# Patient Record
Sex: FEMALE | Race: WHITE | NOT HISPANIC OR LATINO | Employment: UNEMPLOYED | URBAN - METROPOLITAN AREA
[De-identification: names, ages, dates, MRNs, and addresses within clinical notes are randomized per-mention and may not be internally consistent; named-entity substitution may affect disease eponyms.]

---

## 2017-01-23 ENCOUNTER — TELEPHONE (OUTPATIENT)
Dept: NEUROLOGY | Facility: MEDICAL CENTER | Age: 42
End: 2017-01-23

## 2017-01-23 NOTE — TELEPHONE ENCOUNTER
Angelika's  called in and spoke with Dr Soto about his wife stating she had a SZ because she had vomited her Keppra. He states she has a Gi problem so Dr Soto told him to keep giving her Zofran to that she can hold down her pills and stop having SZ. No seizures when taking Zofran. I called pt to see how she was today. She states she is doing well and no more Seizures since taking the Zofran. She says she has plenty of refills from her Gi doctor. Pt will call us if she has any more problems.

## 2017-02-03 NOTE — TELEPHONE ENCOUNTER
John Strong M.D.  Kiera Paula, Med Ass't       Caller: Unspecified (1 week ago)                     She should not drive for now.   Please make sure you document that you told pt.   Thanks,   RA       I called pt and told her she should not drive. She states she has not had any more sz since this but she understood and said she would not drive until she sees Dr Strong. She has EEG scheduled in march as well as F/V

## 2017-02-06 ENCOUNTER — TELEPHONE (OUTPATIENT)
Dept: NEUROLOGY | Facility: MEDICAL CENTER | Age: 42
End: 2017-02-06

## 2017-02-06 NOTE — TELEPHONE ENCOUNTER
Angelika decided she would like to cancel the 24 hour amb EEG and just come for the EMU admission. She does have a follow up on 3/30/17 should she keep that appointment or wait until after the EMU? She said she has been having small seizures so she wants to know what you think.

## 2017-03-08 ENCOUNTER — TELEPHONE (OUTPATIENT)
Dept: NEUROLOGY | Facility: MEDICAL CENTER | Age: 42
End: 2017-03-08

## 2017-03-08 NOTE — TELEPHONE ENCOUNTER
----- Message from John Strong M.D. sent at 3/8/2017  7:18 AM PST -----  Regarding: FW: Non-Urgent Medical Question  Contact: [phone]  I can only provide a generic letter stating her condition. I cannot attest that the incident at [facility] was a seizure or related to a seizure.   RA      ----- Message -----     From: Kiera Paula, Med Ass't     Sent: 2/28/2017   3:50 PM       To: John Strong M.D.  Subject: FW: Non-Urgent Medical Question                  Called pt and she states she needs a letter for court. She states she was at [facility] and was overwhelmed because he son was running around. She started becoming really confused. She states she forgot to scan her dog food at the self check out and they charged her with petty gonzalez. She would like a letter stating she has seizures and it is normal for her to become confused    Please Advise    Thanks Kiera SHAFFER   ----- Message -----     From: Imani Wang, Med Ass't     Sent: 2/28/2017   3:34 PM       To: Kiera Paula Med Ass't  Subject: FW: Non-Urgent Medical Question                      ----- Message -----     From: Angelika Hamilton     Sent: 2/28/2017   3:02 PM       To: Neurology [facility]  Subject: Non-Urgent Medical Question                      I need to please speak with either Dr. Strong or his medical assistant concerning the episodes of becoming easily confused and overwheled that I been having since my seizures in 2016.  I have an appointment with Dr. Strong on March 30th but I am in need of some documentation for court on March 7th of my seizures and ongoing treatment. If you could please call me at [phone] so I can explain in further detail and to see if the Dr can provide a document or letter to the court.    Thank you  Angelika

## 2017-03-09 NOTE — TELEPHONE ENCOUNTER
Message  Received: Yesterday       John Strong M.D.  Kiera Paula, Med Ass't       Caller: Unspecified (Yesterday, 8:01 AM)                     Did u speak with her? I can only offer a generic letter that I have seen her once for epilepsy.   RA            Previous Messages          Pt advised and mailed to her as she wanted.

## 2017-03-10 NOTE — TELEPHONE ENCOUNTER
Yes I spoke with pt. She states she has f/v with you before her court date and would like to discuss further a follow up. I mailed her the letter you wrote per her request.

## 2017-03-30 ENCOUNTER — APPOINTMENT (OUTPATIENT)
Dept: NEUROLOGY | Facility: MEDICAL CENTER | Age: 42
End: 2017-03-30
Payer: COMMERCIAL

## 2017-03-30 ENCOUNTER — TELEPHONE (OUTPATIENT)
Dept: NEUROLOGY | Facility: MEDICAL CENTER | Age: 42
End: 2017-03-30

## 2017-03-30 NOTE — TELEPHONE ENCOUNTER
"Angelika called stating she has been throwing up all morning and that she possibly had a seizure last night. She was \"out of it\" and \"speaking nonsense\" to her . I let her know we still have her scheduled for EMU Monday 4/3/17 but that the insurance is taking longer than expected to approve the visit. I will call today and request an urgent auth.  "

## 2017-03-30 NOTE — PROGRESS NOTES
Future direct admitt for 4/3/17 at 6a for epilepsy monitor x5days.  Dr Strong accepting, ADT signed and held on 3/30/17 @ 1241p and will need to be released upon pt arrival. ADT scanned into epic.  Pt's arriving via POV

## 2017-04-03 ENCOUNTER — HOSPITAL ENCOUNTER (INPATIENT)
Facility: MEDICAL CENTER | Age: 42
LOS: 4 days | DRG: 101 | End: 2017-04-07
Attending: PSYCHIATRY & NEUROLOGY | Admitting: PSYCHIATRY & NEUROLOGY
Payer: COMMERCIAL

## 2017-04-03 DIAGNOSIS — G40.109 LOCALIZATION-RELATED EPILEPSY (HCC): ICD-10-CM

## 2017-04-03 DIAGNOSIS — R41.3 MEMORY LOSS: Chronic | ICD-10-CM

## 2017-04-03 PROCEDURE — A9270 NON-COVERED ITEM OR SERVICE: HCPCS | Performed by: PSYCHIATRY & NEUROLOGY

## 2017-04-03 PROCEDURE — 700102 HCHG RX REV CODE 250 W/ 637 OVERRIDE(OP): Performed by: PSYCHIATRY & NEUROLOGY

## 2017-04-03 PROCEDURE — 700111 HCHG RX REV CODE 636 W/ 250 OVERRIDE (IP): Performed by: PSYCHIATRY & NEUROLOGY

## 2017-04-03 PROCEDURE — 95951 EEG: CPT

## 2017-04-03 PROCEDURE — 770020 HCHG ROOM/CARE - TELE (206)

## 2017-04-03 RX ORDER — LORAZEPAM 2 MG/ML
1 INJECTION INTRAMUSCULAR
Status: DISCONTINUED | OUTPATIENT
Start: 2017-04-03 | End: 2017-04-07 | Stop reason: HOSPADM

## 2017-04-03 RX ORDER — CYCLOBENZAPRINE HCL 10 MG
10 TABLET ORAL 3 TIMES DAILY PRN
Status: DISCONTINUED | OUTPATIENT
Start: 2017-04-03 | End: 2017-04-07 | Stop reason: HOSPADM

## 2017-04-03 RX ORDER — OXYCODONE AND ACETAMINOPHEN 7.5; 325 MG/1; MG/1
1 TABLET ORAL EVERY 8 HOURS PRN
Status: DISCONTINUED | OUTPATIENT
Start: 2017-04-03 | End: 2017-04-07 | Stop reason: HOSPADM

## 2017-04-03 RX ORDER — GABAPENTIN 300 MG/1
600 CAPSULE ORAL 3 TIMES DAILY
Status: DISCONTINUED | OUTPATIENT
Start: 2017-04-03 | End: 2017-04-07 | Stop reason: HOSPADM

## 2017-04-03 RX ORDER — POTASSIUM CHLORIDE 750 MG/1
10 TABLET, FILM COATED, EXTENDED RELEASE ORAL 2 TIMES DAILY
COMMUNITY
End: 2017-08-08

## 2017-04-03 RX ORDER — POTASSIUM CHLORIDE 1.5 G/1.58G
10 POWDER, FOR SOLUTION ORAL 2 TIMES DAILY
Status: ON HOLD | COMMUNITY
End: 2017-04-03

## 2017-04-03 RX ORDER — ESTRADIOL 1 MG/1
1.5 TABLET ORAL DAILY
Status: DISCONTINUED | OUTPATIENT
Start: 2017-04-03 | End: 2017-04-07 | Stop reason: HOSPADM

## 2017-04-03 RX ORDER — ALBUTEROL SULFATE 90 UG/1
2 AEROSOL, METERED RESPIRATORY (INHALATION) EVERY 6 HOURS PRN
Status: DISCONTINUED | OUTPATIENT
Start: 2017-04-03 | End: 2017-04-07 | Stop reason: HOSPADM

## 2017-04-03 RX ORDER — CYCLOBENZAPRINE HCL 10 MG
10 TABLET ORAL 3 TIMES DAILY PRN
COMMUNITY
End: 2023-01-18

## 2017-04-03 RX ORDER — LISINOPRIL 10 MG/1
10 TABLET ORAL DAILY
Status: DISCONTINUED | OUTPATIENT
Start: 2017-04-03 | End: 2017-04-07 | Stop reason: HOSPADM

## 2017-04-03 RX ORDER — POTASSIUM CHLORIDE 1.5 G/1.58G
10 POWDER, FOR SOLUTION ORAL 2 TIMES DAILY
Status: DISCONTINUED | OUTPATIENT
Start: 2017-04-03 | End: 2017-04-04

## 2017-04-03 RX ORDER — PAROXETINE HYDROCHLORIDE 20 MG/1
20 TABLET, FILM COATED ORAL NIGHTLY
Status: DISCONTINUED | OUTPATIENT
Start: 2017-04-03 | End: 2017-04-07 | Stop reason: HOSPADM

## 2017-04-03 RX ORDER — ALPRAZOLAM 1 MG/1
1 TABLET ORAL
Status: DISCONTINUED | OUTPATIENT
Start: 2017-04-03 | End: 2017-04-07 | Stop reason: HOSPADM

## 2017-04-03 RX ORDER — ALBUTEROL SULFATE 2.5 MG/3ML
2.5 SOLUTION RESPIRATORY (INHALATION) EVERY 4 HOURS PRN
Status: DISCONTINUED | OUTPATIENT
Start: 2017-04-03 | End: 2017-04-07 | Stop reason: HOSPADM

## 2017-04-03 RX ADMIN — OXYCODONE AND ACETAMINOPHEN 1 TABLET: 7.5; 325 TABLET ORAL at 09:34

## 2017-04-03 RX ADMIN — ALPRAZOLAM 1 MG: 1 TABLET ORAL at 22:11

## 2017-04-03 RX ADMIN — POTASSIUM CHLORIDE 10 MEQ: 1.5 POWDER, FOR SOLUTION ORAL at 09:35

## 2017-04-03 RX ADMIN — POTASSIUM CHLORIDE 10 MEQ: 1.5 POWDER, FOR SOLUTION ORAL at 22:12

## 2017-04-03 RX ADMIN — OXYCODONE AND ACETAMINOPHEN 1 TABLET: 7.5; 325 TABLET ORAL at 22:11

## 2017-04-03 RX ADMIN — CYCLOBENZAPRINE HYDROCHLORIDE 10 MG: 10 TABLET, FILM COATED ORAL at 22:11

## 2017-04-03 RX ADMIN — ENOXAPARIN SODIUM 40 MG: 100 INJECTION SUBCUTANEOUS at 09:35

## 2017-04-03 RX ADMIN — LISINOPRIL 10 MG: 10 TABLET ORAL at 09:34

## 2017-04-03 RX ADMIN — PAROXETINE HYDROCHLORIDE 20 MG: 20 TABLET, FILM COATED ORAL at 22:11

## 2017-04-03 RX ADMIN — GABAPENTIN 600 MG: 300 CAPSULE ORAL at 15:04

## 2017-04-03 RX ADMIN — GABAPENTIN 600 MG: 300 CAPSULE ORAL at 22:11

## 2017-04-03 RX ADMIN — GABAPENTIN 600 MG: 300 CAPSULE ORAL at 09:34

## 2017-04-03 RX ADMIN — CYCLOBENZAPRINE HYDROCHLORIDE 10 MG: 10 TABLET, FILM COATED ORAL at 16:14

## 2017-04-03 ASSESSMENT — LIFESTYLE VARIABLES
ALCOHOL_USE: YES
AVERAGE NUMBER OF DAYS PER WEEK YOU HAVE A DRINK CONTAINING ALCOHOL: 1
TOTAL SCORE: 4
EVER_SMOKED: YES
TOTAL SCORE: 4
ON A TYPICAL DAY WHEN YOU DRINK ALCOHOL HOW MANY DRINKS DO YOU HAVE: 1
HAVE YOU EVER FELT YOU SHOULD CUT DOWN ON YOUR DRINKING: YES
EVER FELT BAD OR GUILTY ABOUT YOUR DRINKING: YES
TOTAL SCORE: 4
EVER HAD A DRINK FIRST THING IN THE MORNING TO STEADY YOUR NERVES TO GET RID OF A HANGOVER: YES
DOES PATIENT WANT TO STOP DRINKING: NO
HOW MANY TIMES IN THE PAST YEAR HAVE YOU HAD 5 OR MORE DRINKS IN A DAY: 0
HAVE PEOPLE ANNOYED YOU BY CRITICIZING YOUR DRINKING: YES
CONSUMPTION TOTAL: POSITIVE

## 2017-04-03 ASSESSMENT — PAIN SCALES - GENERAL
PAINLEVEL_OUTOF10: 4
PAINLEVEL_OUTOF10: 4
PAINLEVEL_OUTOF10: 3
PAINLEVEL_OUTOF10: 6
PAINLEVEL_OUTOF10: 6

## 2017-04-03 NOTE — PROCEDURES
VIDEO ELECTROENCEPHALOGRAM / EPILEPSY MONITORING UNIT REPORT          DOS:   4/3/2017 - 4/7/2017      INDICATION:  Angelika Hamilton 41 y.o. female presenting with recurrent seizures.     CURRENT ANTIEPILEPTIC REGIMEN: Levetiracetam 750 mg po bid, which was held during the admission.       TECHNIQUE: A 30-channel, 5 days video electroencephalogram (VEEG) was performed in accordance with the international 10-20 system. This digital study was reviewed in bipolar and referential montages. The recording examined the patient during wakeful, drowsy and sleep states.     The patient was not sleep deprived.    There was supervised withdrawal of the following medications: Levetiracetam was held during entire admission.       DESCRIPTION OF THE RECORD:  During the awake state, background shows symmetrical 10 Hz alpha activity posteriorly with amplitude of 70 mV.  There was reactivity with eye opening/closure.  Normal anterior-posterior gradient was noted with faster beta frequencies seen anteriorly.  During drowsiness, high-amplitude delta frequencies were seen.    During the sleep state, background shows diffuse high-amplitude 4-5 Hz delta activity.  Symmetrical high-amplitude sleep spindles and vertex sharp activities were seen in the central leads.    ACTIVATION PROCEDURES:    Hyperventilation was performed by the patient for a total of 3 minutes. The technician performing the test noted good effort. This technique produced electroencephalographic changes in keeping with the expected bilaterally synchronous, frontally predominant, high amplitude slow waves build up.     Intermittent Photic stimulation was performed in a stepwise fashion from 1 to 30 Hz and elicited a normal response (photic driving), most noticeable in the posterior leads.      ICTAL AND/OR INTERICTAL FINDINGS:    No focal or generalized epileptiform activity was noted. No regional slowing was seen during this routine study.  No seizures were recorded.       EVENT(S):  No clinical events captured.     EKG: sampling review of EKG recording demonstrated sinus rhythm.      INTERPRETATION:   This is a normal 5 days video electroencephalogram recording in the awake, drowsy and sleep state.  Despite holding levetiracetam for 5 days, no events or seizures were captured during the study. Clinical correlation is recommended.    Note: A normal electroencephalogram does not rule out epilepsy.         John Strong MD  Medical Director, Epilepsy and Neurodiagnostics.    Clinical  of Neurology [facility].    Diplomate in Neurology, Epilepsy, and Electrodiagnostic Medicine.    Office: [phone]  Fax: [phone]    AMANDA COX    DD:  04/03/2017 12:20:35  DT:  04/03/2017 12:59:04    D#:  [id number]  Job#:  [id number]

## 2017-04-03 NOTE — IP AVS SNAPSHOT
" <p align=\"LEFT\"><IMG SRC=\"//[email or web address]\" alt=\"Image\" WIDTH=\"50%\" HEIGHT=\"200\" BORDER=\"\"></p>                   Name:Angelika Hamilton  Medical Record Number:[id number]  CSN: [id number]    YOB: 1975   Age: 41 y.o.  Sex: female  HT:1.651 m (5' 5\") WT: 94.5 kg (208 lb 5.4 oz)          Admit Date: 4/3/2017     Discharge Date:   Today's Date: 4/7/2017  Attending Doctor:  John Strong M.D.                  Allergies:  Sulfa drugs and Tape          Your appointments     Apr 10, 2017  1:00 PM   FOLLOW UP with Catarina Santoro M.D.   [facility] (--)    [address], [address]  [facility] [id number] [phone]              Follow-up Information     1. Follow up with John Strong M.D.. Schedule an appointment as soon as possible for a visit in 2 months.    Specialty:  Neurology    Contact information    [address]  [address]  [facility] [id number] [phone]           Medication List      Take these Medications        Instructions    alprazolam 1 MG Tabs   What changed:    - when to take this  - reasons to take this  - additional instructions   Commonly known as:  XANAX    Take 1 Tab by mouth every bedtime. Take 1 tab po qhs for 4 weeks, then lower to 0.5 tab po qhs thereafter.   Dose:  1 mg       cyclobenzaprine 10 MG Tabs   Commonly known as:  FLEXERIL    Take 10 mg by mouth 3 times a day as needed.   Dose:  10 mg       estradiol 1 MG Tabs   Commonly known as:  ESTRACE    Take 1.5 mg by mouth every day.   Dose:  1.5 mg       gabapentin 600 MG tablet   Commonly known as:  NEURONTIN    Take 600 mg by mouth 3 times a day.   Dose:  600 mg       levetiracetam 250 MG tablet   What changed:    - medication strength  - how much to take  - additional instructions   Commonly known as:  KEPPRA    Take 1 Tab by mouth 2 times a day. Take 1 tab po bid for one week, then lower to 1 tab po qhs for one week, then discontinue.   Dose:  250 mg       lisinopril 10 " MG Tabs   Commonly known as:  PRINIVIL    Take 10 mg by mouth every day.   Dose:  10 mg       oxycodone-acetaminophen 7.5-325 MG per tablet   Commonly known as:  PERCOCET    Take 1 Tab by mouth every 8 hours as needed for Severe Pain.   Dose:  1 Tab       paroxetine 20 MG Tabs   Commonly known as:  PAXIL    Take 20 mg by mouth every day.   Dose:  20 mg       potassium chloride ER 10 MEQ tablet   Commonly known as:  KLOR-CON    Take 10 mEq by mouth 2 times a day.   Dose:  10 mEq

## 2017-04-03 NOTE — CARE PLAN
Problem: Communication  Goal: The ability to communicate needs accurately and effectively will improve  Outcome: PROGRESSING AS EXPECTED  Patient communicates needs appropriately with call light.    Problem: Safety  Goal: Will remain free from injury  Outcome: PROGRESSING AS EXPECTED  Call light within reach, hourly rounding in practice. Pt given Lovenox for DVT/VTE prophylaxis.

## 2017-04-03 NOTE — IP AVS SNAPSHOT
4/7/2017          Angelika Hamilton  [address]  [place] [address]    Dear Angelika:    Critical access hospital wants to ensure your discharge home is safe and you or your loved ones have had all your questions answered regarding your care after you leave the hospital.    You may receive a telephone call within two days of your discharge.  This call is to make certain you understand your discharge instructions as well as ensure we provided you with the best care possible during your stay with us.     The call will only last approximately 3-5 minutes and will be done by a nurse.    Once again, we want to ensure your discharge home is safe and that you have a clear understanding of any next steps in your care.  If you have any questions or concerns, please do not hesitate to contact us, we are here for you.  Thank you for choosing [facility] for your healthcare needs.    Sincerely,    Shashank Manning    [facility]

## 2017-04-03 NOTE — PROGRESS NOTES
Patient resting quietly in bed, no S/S of distress, AA&Ox4. Denies SOB, chest pain, dizziness. Bed alarm on, call light within reach,  pt calls appropriately and does not get out of bed, VEEG and tele monitor in place. Bed in lowest position, bed locked, RN and CNA numbers provided, no further needs at this time. No changes from EPIC. Hourly rounding in place.
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
## 2017-04-03 NOTE — IP AVS SNAPSHOT
" Home Care Instructions                                                                                                                  Name:Angelika Hamilton  Medical Record Number:[id number]  CSN: [id number]    YOB: 1975   Age: 41 y.o.  Sex: female  HT:1.651 m (5' 5\") WT: 94.5 kg (208 lb 5.4 oz)          Admit Date: 4/3/2017     Discharge Date:   Today's Date: 4/7/2017  Attending Doctor:  John Strong M.D.                  Allergies:  Sulfa drugs and Tape            Discharge Instructions       Discharge Instructions    Discharged to home by car with relative. Discharged via wheelchair, hospital escort: Yes.  Special equipment needed: Not Applicable    Be sure to schedule a follow-up appointment with your primary care doctor or any specialists as instructed.     Discharge Plan:   Influenza Vaccine Indication: Not indicated: Previously immunized this influenza season and > 8 years of age    I understand that a diet low in cholesterol, fat, and sodium is recommended for good health. Unless I have been given specific instructions below for another diet, I accept this instruction as my diet prescription.       Special Instructions: None    · Is patient discharged on Warfarin / Coumadin?   No     · Is patient Post Blood Transfusion?  No    Depression / Suicide Risk    As you are discharged from this [facility] facility, it is important to learn how to keep safe from harming yourself.    Recognize the warning signs:  · Abrupt changes in personality, positive or negative- including increase in energy   · Giving away possessions  · Change in eating patterns- significant weight changes-  positive or negative  · Change in sleeping patterns- unable to sleep or sleeping all the time   · Unwillingness or inability to communicate  · Depression  · Unusual sadness, discouragement and loneliness  · Talk of wanting to die  · Neglect of personal appearance   · Rebelliousness- reckless behavior  · Withdrawal from " people/activities they love  · Confusion- inability to concentrate     If you or a loved one observes any of these behaviors or has concerns about self-harm, here's what you can do:  · Talk about it- your feelings and reasons for harming yourself  · Remove any means that you might use to hurt yourself (examples: pills, rope, extension cords, firearm)  · Get professional help from the community (Mental Health, Substance Abuse, psychological counseling)  · Do not be alone:Call your Safe Contact- someone whom you trust who will be there for you.  · Call your local CRISIS HOTLINE 164-8232 or 493-638-9491  · Call your local Children's Mobile Crisis Response Team [place] (360) 166-4488 or www.ProPerforma  · Call the toll free National Suicide Prevention Hotlines   · National Suicide Prevention Lifeline 103-461-FQMA (0415)  · National Hope Line Network 800-SUICIDE (497-4886)        Your appointments     Apr 10, 2017  1:00 PM   FOLLOW UP with Catarina Santoro M.D.   [facility] (--)    [address] [place] [address] [address]  [place] [address] [phone]              Follow-up Information     1. Follow up with John Strong M.D.. Schedule an appointment as soon as possible for a visit in 2 months.    Specialty:  Neurology    Contact information    [address] [facility] [address]  [place] [address] [phone]           Discharge Medication Instructions:    Below are the medications your physician expects you to take upon discharge:    Review all your home medications and newly ordered medications with your doctor and/or pharmacist. Follow medication instructions as directed by your doctor and/or pharmacist.    Please keep your medication list with you and share with your physician.               Medication List      CHANGE how you take these medications        Instructions    alprazolam 1 MG Tabs   What changed:    - when to take this  - reasons to take this  - additional instructions     Last time this was given:  1 mg on 4/6/2017  8:28 PM   Commonly known as:  XANAX    Take 1 Tab by mouth every bedtime. Take 1 tab po qhs for 4 weeks, then lower to 0.5 tab po qhs thereafter.   Dose:  1 mg       levetiracetam 250 MG tablet   What changed:    - medication strength  - how much to take  - additional instructions   Commonly known as:  KEPPRA    Take 1 Tab by mouth 2 times a day. Take 1 tab po bid for one week, then lower to 1 tab po qhs for one week, then discontinue.   Dose:  250 mg         CONTINUE taking these medications        Instructions    cyclobenzaprine 10 MG Tabs   Last time this was given:  10 mg on 4/6/2017  8:28 PM   Commonly known as:  FLEXERIL    Take 10 mg by mouth 3 times a day as needed.   Dose:  10 mg       estradiol 1 MG Tabs   Last time this was given:  1.5 mg on 4/6/2017  8:29 PM   Commonly known as:  ESTRACE    Take 1.5 mg by mouth every day.   Dose:  1.5 mg       gabapentin 600 MG tablet   Commonly known as:  NEURONTIN    Take 600 mg by mouth 3 times a day.   Dose:  600 mg       lisinopril 10 MG Tabs   Last time this was given:  10 mg on 4/7/2017  8:37 AM   Commonly known as:  PRINIVIL    Take 10 mg by mouth every day.   Dose:  10 mg       oxycodone-acetaminophen 7.5-325 MG per tablet   Last time this was given:  1 Tab on 4/7/2017  8:37 AM   Commonly known as:  PERCOCET    Take 1 Tab by mouth every 8 hours as needed for Severe Pain.   Dose:  1 Tab       paroxetine 20 MG Tabs   Last time this was given:  20 mg on 4/6/2017  8:28 PM   Commonly known as:  PAXIL    Take 20 mg by mouth every day.   Dose:  20 mg       potassium chloride ER 10 MEQ tablet   Last time this was given:  10 mEq on 4/7/2017  8:36 AM   Commonly known as:  KLOR-CON    Take 10 mEq by mouth 2 times a day.   Dose:  10 mEq               Instructions           Diet / Nutrition:    Follow any diet instructions given to you by your doctor or the dietician, including how much salt (sodium) you are allowed each  day.    If you are overweight, talk to your doctor about a weight reduction plan.    Activity:    Remain physically active following your doctor's instructions about exercise and activity.    Rest often.     Any time you become even a little tired or short of breath, SIT DOWN and rest.    Worsening Symptoms:    Report any of the following signs and symptoms to the doctor's office immediately:    *Pain of jaw, arm, or neck  *Chest pain not relieved by medication                               *Dizziness or loss of consciousness  *Difficulty breathing even when at rest   *More tired than usual                                       *Bleeding drainage or swelling of surgical site  *Swelling of feet, ankles, legs or stomach                 *Fever (>100ºF)  *Pink or blood tinged sputum  *Weight gain (3lbs/day or 5lbs /week)           *Shock from internal defibrillator (if applicable)  *Palpitations or irregular heartbeats                *Cool and/or numb extremities    Stroke Awareness    Common Risk Factors for Stroke include:    Age  Atrial Fibrillation  Carotid Artery Stenosis  Diabetes Mellitus  Excessive alcohol consumption  High blood pressure  Overweight   Physical inactivity  Smoking    Warning signs and symptoms of a stroke include:    *Sudden numbness or weakness of the face, arm or leg (especially on one side of the body).  *Sudden confusion, trouble speaking or understanding.  *Sudden trouble seeing in one or both eyes.  *Sudden trouble walking, dizziness, loss of balance or coordination.Sudden severe headache with no known cause.    It is very important to get treatment quickly when a stroke occurs. If you experience any of the above warning signs, call 911 immediately.                   Disclaimer         Quit Smoking / Tobacco Use:    I understand the use of any tobacco products increases my chance of suffering from future heart disease or stroke and could cause other illnesses which may shorten my life.  Quitting the use of tobacco products is the single most important thing I can do to improve my health. For further information on smoking / tobacco cessation call a Toll Free Quit Line at 1-131.783.2488 (*National Cancer [place]) or 1-136.851.4566 (American Lung Association) or you can access the web based program at www.lungusa.org.    [place] Tobacco Users Help Line:  (531) 759-5902       Toll Free: 1-308.332.9162  Quit Tobacco Program [facility] (127)126-9687    Crisis Hotline:    [place] Crisis Hotline:  1-866-RGOZWTB or 1-811.676.3992    [place] Crisis Hotline:    1-204.888.8337 or 733-485-8114    Discharge Survey:   Thank you for choosing [facility]. We hope we did everything we could to make your hospital stay a pleasant one. You may be receiving a phone survey and we would appreciate your time and participation in answering the questions. Your input is very valuable to us in our efforts to improve our service to our patients and their families.        My signature on this form indicates that:    1. I have reviewed and understand the above information.  2. My questions regarding this information have been answered to my satisfaction.  3. I have formulated a plan with my discharge nurse to obtain my prescribed medications for home.                  Disclaimer         __________________________________                     __________       ________                       Patient Signature                                                 Date                    Time

## 2017-04-03 NOTE — IP AVS SNAPSHOT
Harperlabz Access Code: Activation code not generated  Current Harperlabz Status: Active    [employer] Media[employer]  A secure, online tool to manage your health information     [employer]’s Harperlabz® is a secure, online tool that connects you to your personalized health information from the privacy of your home -- day or night - making it very easy for you to manage your healthcare. Once the activation process is completed, you can even access your medical information using the Harperlabz kendy, which is available for free in the Apple Kendy store or Google Play store.     Harperlabz provides the following levels of access (as shown below):   My Chart Features   [facility] [place] [facility] Primary Care Doctor [facility]  Specialists [facility]  Urgent  Care Non-[facility]  Primary Care  Doctor   Email your healthcare team securely and privately 24/7 X X X X   Manage appointments: schedule your next appointment; view details of past/upcoming appointments X      Request prescription refills. X      View recent personal medical records, including lab and immunizations X X X X   View health record, including health history, allergies, medications X X X X   Read reports about your outpatient visits, procedures, consult and ER notes X X X X   See your discharge summary, which is a recap of your hospital and/or ER visit that includes your diagnosis, lab results, and care plan. X X       How to register for Harperlabz:  1. Go to  [email or web address].  2. Click on the Sign Up Now box, which takes you to the New Member Sign Up page. You will need to provide the following information:  a. Enter your Harperlabz Access Code exactly as it appears at the top of this page. (You will not need to use this code after you’ve completed the sign-up process. If you do not sign up before the expiration date, you must request a new code.)   b. Enter your date of birth.   c. Enter your home email address.   d. Click Submit, and follow the next screen’s instructions.  3. Create a Harperlabz ID. This will  be your uma information technology login ID and cannot be changed, so think of one that is secure and easy to remember.  4. Create a uma information technology password. You can change your password at any time.  5. Enter your Password Reset Question and Answer. This can be used at a later time if you forget your password.   6. Enter your e-mail address. This allows you to receive e-mail notifications when new information is available in uma information technology.  7. Click Sign Up. You can now view your health information.    For assistance activating your uma information technology account, call [phone]

## 2017-04-03 NOTE — DISCHARGE PLANNING
Care Transition Team Assessment    Information Source  Orientation : Oriented x 4  Information Given By: Patient  Who is responsible for making decisions for patient? : Patient         Elopement Risk  Legal Hold: No  Ambulatory or Self Mobile in Wheelchair: Yes  Disoriented: No  Psychiatric Symptoms: None  History of Wandering: No  Elopement this Admit: No  Vocalizing Wanting to Leave: No  Displays Behaviors, Body Language Wanting to Leave: No-Not at Risk for Elopement  Elopement Risk: Not at Risk for Elopement    Interdisciplinary Discharge Planning  Primary Care Physician:  Oliver)  Lives with - Patient's Self Care Capacity: Spouse  Support Systems: Spouse / Significant Other  Patient Expects to be Discharged to::  (Home)  Assistance Needed: Unknown at this Time  Durable Medical Equipment: Home Oxygen  DME Provider / Phone: Matthew    Discharge Preparedness  What is your plan after discharge?: Home with help  What are your discharge supports?: Spouse                        Advance Directive  Advance Directive?: None  Advance Directive offered?: AD Booklet refused    Domestic Abuse  Have you ever been the victim of abuse or violence?: No  Physical Abuse or Sexual Abuse: No    Psychological Assessment  History of Substance Abuse: None  History of Psychiatric Problems: No         Anticipated Discharge Information  Anticipated discharge disposition: Home  Discharge Address:  ([place], [place])    SW met with pt at bedside.  Pt anticipates discharging home this Friday.  Pt states she will return home with her , pt's extended family lives in [place] and will be available to assist with ADLS.

## 2017-04-03 NOTE — EEG PROGRESS NOTE
VIDEO ELECTROENCEPHALOGRAM / EPILEPSY MONITORING UNIT REPORT          DOS:   4/3/2017 - 4/7/2017      INDICATION:  Angelika Hamilton 41 y.o. female presenting with recurrent seizures.     CURRENT ANTIEPILEPTIC REGIMEN: Levetiracetam 750 mg po bid, which was held during the admission.      TECHNIQUE: A 30-channel, 5 days video electroencephalogram (VEEG) was performed in accordance with the international 10-20 system. This digital study was reviewed in bipolar and referential montages. The recording examined the patient during wakeful, drowsy and sleep states.     The patient was not sleep deprived.    There was supervised withdrawal of the following medications: Levetiracetam was held during entire admission.       DESCRIPTION OF THE RECORD:  During the awake state, background shows symmetrical 10 Hz alpha activity posteriorly with amplitude of 70 mV.  There was reactivity with eye opening/closure.  Normal anterior-posterior gradient was noted with faster beta frequencies seen anteriorly.  During drowsiness, high-amplitude delta frequencies were seen.    During the sleep state, background shows diffuse high-amplitude 4-5 Hz delta activity.  Symmetrical high-amplitude sleep spindles and vertex sharp activities were seen in the central leads.    ACTIVATION PROCEDURES:   Hyperventilation was performed by the patient for a total of 3 minutes. The technician performing the test noted good effort. This technique produced electroencephalographic changes in keeping with the expected bilaterally synchronous, frontally predominant, high amplitude slow waves build up.     Intermittent Photic stimulation was performed in a stepwise fashion from 1 to 30 Hz and elicited a normal response (photic driving), most noticeable in the posterior leads.      ICTAL AND/OR INTERICTAL FINDINGS:   No focal or generalized epileptiform activity was noted. No regional slowing was seen during this routine study.  No seizures were recorded.      EVENT(S):  No clinical events captured.     EKG: sampling review of EKG recording demonstrated sinus rhythm.      INTERPRETATION:   This is a normal 5 days video electroencephalogram recording in the awake, drowsy and sleep state.  No events were captured during the study. Clinical correlation is recommended.    Note: A normal electroencephalogram does not rule out epilepsy.         John Strong MD  Medical Director, Epilepsy and Neurodiagnostics.   Clinical  of Neurology [facility].   Diplomate in Neurology, Epilepsy, and Electrodiagnostic Medicine.   Office: [phone]  Fax: [phone]

## 2017-04-04 PROCEDURE — 95951 HCHG EEG-VIDEO-24HR: CPT

## 2017-04-04 PROCEDURE — 700111 HCHG RX REV CODE 636 W/ 250 OVERRIDE (IP): Performed by: PSYCHIATRY & NEUROLOGY

## 2017-04-04 PROCEDURE — 770020 HCHG ROOM/CARE - TELE (206)

## 2017-04-04 PROCEDURE — A9270 NON-COVERED ITEM OR SERVICE: HCPCS | Performed by: PSYCHIATRY & NEUROLOGY

## 2017-04-04 PROCEDURE — 700102 HCHG RX REV CODE 250 W/ 637 OVERRIDE(OP): Performed by: PSYCHIATRY & NEUROLOGY

## 2017-04-04 RX ORDER — POTASSIUM CHLORIDE 1.5 G/1.58G
10 POWDER, FOR SOLUTION ORAL 2 TIMES DAILY
Status: DISCONTINUED | OUTPATIENT
Start: 2017-04-04 | End: 2017-04-07 | Stop reason: HOSPADM

## 2017-04-04 RX ORDER — POTASSIUM CHLORIDE 750 MG/1
10 TABLET, FILM COATED, EXTENDED RELEASE ORAL 2 TIMES DAILY
Status: DISCONTINUED | OUTPATIENT
Start: 2017-04-04 | End: 2017-04-07 | Stop reason: HOSPADM

## 2017-04-04 RX ADMIN — GABAPENTIN 600 MG: 300 CAPSULE ORAL at 16:37

## 2017-04-04 RX ADMIN — ESTRADIOL 1.5 MG: 1 TABLET ORAL at 20:08

## 2017-04-04 RX ADMIN — CYCLOBENZAPRINE HYDROCHLORIDE 10 MG: 10 TABLET, FILM COATED ORAL at 20:08

## 2017-04-04 RX ADMIN — LISINOPRIL 10 MG: 10 TABLET ORAL at 09:00

## 2017-04-04 RX ADMIN — CYCLOBENZAPRINE HYDROCHLORIDE 10 MG: 10 TABLET, FILM COATED ORAL at 16:48

## 2017-04-04 RX ADMIN — GABAPENTIN 600 MG: 300 CAPSULE ORAL at 09:00

## 2017-04-04 RX ADMIN — OXYCODONE AND ACETAMINOPHEN 1 TABLET: 7.5; 325 TABLET ORAL at 18:26

## 2017-04-04 RX ADMIN — PAROXETINE HYDROCHLORIDE 20 MG: 20 TABLET, FILM COATED ORAL at 20:08

## 2017-04-04 RX ADMIN — POTASSIUM CHLORIDE 10 MEQ: 750 TABLET, FILM COATED, EXTENDED RELEASE ORAL at 12:18

## 2017-04-04 RX ADMIN — OXYCODONE AND ACETAMINOPHEN 1 TABLET: 7.5; 325 TABLET ORAL at 10:10

## 2017-04-04 RX ADMIN — ALPRAZOLAM 1 MG: 1 TABLET ORAL at 20:08

## 2017-04-04 RX ADMIN — GABAPENTIN 600 MG: 300 CAPSULE ORAL at 20:08

## 2017-04-04 RX ADMIN — ENOXAPARIN SODIUM 40 MG: 100 INJECTION SUBCUTANEOUS at 09:00

## 2017-04-04 RX ADMIN — CYCLOBENZAPRINE HYDROCHLORIDE 10 MG: 10 TABLET, FILM COATED ORAL at 10:11

## 2017-04-04 RX ADMIN — POTASSIUM CHLORIDE 10 MEQ: 750 TABLET, FILM COATED, EXTENDED RELEASE ORAL at 20:08

## 2017-04-04 ASSESSMENT — PAIN SCALES - GENERAL
PAINLEVEL_OUTOF10: 4
PAINLEVEL_OUTOF10: 6
PAINLEVEL_OUTOF10: 6
PAINLEVEL_OUTOF10: 4

## 2017-04-04 NOTE — PROGRESS NOTES
"Angelika Reva Hamilton assessed after assuming care. no signs of acute distress and appears to be in stable condition. Last documented VS: /80 mmHg  Pulse 86  Temp(Src) 36.5 °C (97.7 °F)  Resp 16  Ht 1.651 m (5' 5\")  Wt 94.5 kg (208 lb 5.4 oz)  BMI 34.67 kg/m2  SpO2 94%      Mobility: Patient in bed at this time    Fall precautions in place. Hourly rounding in place and explained to Angelika. Educated Angelika on call light use as well as phone instructions to call RN or CNA directly. Possessions within patient's reach, fall precautions in place.     Angelika educated on Pain, Position, Potty, Priorities and instructed to notify RN if anything additional can be done to make stay more comfortable including linen change and toiletries.     Care plan:     Problem: Safety   Goal: Will remain free from falls   Outcome: PROGRESSING as EXPECTED   Angelika educated about fall risk. Will remain free from injury or falls. Appropriate side rails up. Bed in low position, call light and possions within reach, bed alarm in use. Hourly rounding in place.     Problem: Pain Management   Goal: Pain level will decrease to patient’s comfort goal   Outcome: PROGRESSING as EXPECTED   Following pain management plan, Angelika reports pain on 0-10 scale      *addendum to follow below at end or throughout shift if significant events occurred: if blank n/a    OVERNIGHT SIGNIFICANT EVENTS:  n/a      "

## 2017-04-04 NOTE — PROGRESS NOTES
No chief complaint on file.      Problem List Items Addressed This Visit     None          Interim history:  Angelika Hamilton was electively admitted to the EMU. We are holding Keppra since admission. So far no spells. She continuous on her nocturnal dose of xanax.     She is eating her meals and drinking fluids without any problems.     She has no complaints.     Mood is good.     No reports of n or v.     No sob or chest pain. Has been moving her legs.       Past medical history:   No past medical history on file.    Past surgical history:   Past Surgical History   Procedure Laterality Date   • Hysterectomy, total abdominal         Family history:   Family History   Problem Relation Age of Onset   • Heart Disease Paternal Grandmother    • Stroke Paternal Grandmother    • Arthritis Paternal Grandmother    • Arthritis Mother    • Arthritis Father    • Arthritis Maternal Grandmother    • Arthritis Maternal Grandfather    • Arthritis Paternal Grandfather    • Cancer Paternal Grandfather        Social history:   Social History     Social History   • Marital Status:      Spouse Name: N/A   • Number of Children: N/A   • Years of Education: N/A     Occupational History   • Not on file.     Social History Main Topics   • Smoking status: Never Smoker    • Smokeless tobacco: Never Used   • Alcohol Use: 0.6 oz/week     1 Shots of liquor per week   • Drug Use: No   • Sexual Activity: Not on file     Other Topics Concern   • Not on file     Social History Narrative       Current medications:   Current Facility-Administered Medications   Medication Dose   • potassium chloride ER (KLOR-CON) tablet 10 mEq  10 mEq    Or   • potassium chloride (KLOR-CON) 20 MEQ packet 10 mEq  10 mEq   • lorazepam (ATIVAN) injection 1 mg  1 mg   • albuterol (PROVENTIL) 2.5mg/3ml nebulizer solution 2.5 mg  2.5 mg   • albuterol inhaler 2 Puff  2 Puff   • alprazolam (XANAX) tablet 1 mg  1 mg   • estradiol (ESTRACE) tablet 1.5 mg  1.5 mg   •  gabapentin (NEURONTIN) capsule 600 mg  600 mg   • lisinopril (PRINIVIL) 10 MG tablet 10 mg  10 mg   • oxycodone-acetaminophen (PERCOCET) 7.5-325 MG per tablet 1 Tab  1 Tab   • paroxetine (PAXIL) tablet 20 mg  20 mg   • enoxaparin (LOVENOX) inj 40 mg  40 mg   • cyclobenzaprine (FLEXERIL) tablet 10 mg  10 mg       Medication Allergy:  Allergies   Allergen Reactions   • Sulfa Drugs Swelling     Patient states tongue swells   • Tape      Paper tape is fine         Review of systems:   Constitutional: denies fever, night sweats, weight loss, or malaise/fatigue.    Eyes: denies acute vision change, eye pain or secretion.    Ears, Nose, Mouth, Throat: denies nasal secretion, nasal bleeding, difficulty swallowing, hearing loss, tinnitus, vertigo, ear pain, oral ulcers or lesions.    Endocrine: denies recent weight changes, heat or cold intolerance, polyuria, polydypsia, polyphagia,abnormal hair growth.  Cardiovascular: denies new onset of chest pain, palpitations, syncope, lower extremity edema, or dyspnea of exertion.  Pulmonary: denies shortness of breath, new onset of cough, hemoptysis, wheezing, chest pain or flu-like symptoms.    GI: denies nausea, vomiting, diarrhea, GI bleeding, change in appetite, abdominal pain, and change in bowel habits.  : denies urinary incontinence, retention or hematuria.  Heme/oncology: denies history of easy bruising or bleeding. No history of cancer.    Allergy/immunology: denies hives/urticarial.  Dermatologic: denies new rash.  Musculoskeletal:denies joint swelling or pain, muscle pain, neck pain.    Neurologic: denies headaches, acute visual changes, facial droopiness, muscle weakness (focal or generalized), paresthesias, anesthesia, ataxia, change in speech or language, memory loss.  Psychiatric: denies symptoms of worsening of depression, hallucinations, suicidal or homicidal thoughts.    Physical examination:   Filed Vitals:    04/03/17 2000 04/04/17 0000 04/04/17 0400 04/04/17  0800   BP: 123/80 128/86 134/80 118/77   Pulse: 86 86 70 61   Temp: 36.5 °C (97.7 °F) 36.2 °C (97.1 °F) 36.2 °C (97.1 °F) 35.8 °C (96.5 °F)   Resp: 16 16 16 14   Height:       Weight:       SpO2: 94% 93% 93% 92%     General: Patient in no acute distress, pleasant and cooperative. Overweight.    HEENT: Normocephalic, no signs of acute trauma.    Neck: supple, no meningeal signs or carotid bruits. There is normal range of motion. No tenderness on exam.    Chest: clear to auscultation. No cough.    CV: RRR, no murmurs.    Skin: no signs of acute rashes or trauma.    Musculoskeletal: joints exhibit full range of motion, without any pain to palpation. There are no signs of joint or muscle swelling. There is no tenderness to deep palpation of muscles.    Psychiatric: No hallucinatory behavior. Denies symptoms of depression or suicidal ideation. Mood and affect appear normal on exam.     NEUROLOGICAL EXAM:    Mental status, orientation: Awake, alert and fully oriented.    Speech and language: speech is clear and fluent. The patient is able to name, repeat and comprehend.    Memory: There is intact recollection of recent and remote events.    Cranial nerve exam: Pupils are 3-4 mm bilaterally and equally reactive to light and accommodation. Visual fields are intact by confrontation. There is no nystagmus on primary or secondary gaze. Intact full EOM in all directions of gaze. Face appears symmetric. Sensation in the face is intact to light touch. Uvula is midline. Palate elevates symmetrically. Tongue is midline and without any signs of tongue biting or fasciculations. Sternocleidomastoid muscles exhibit is normal strength bilaterally. Shoulder shrug is intact bilaterally.    Motor exam: Strength is 5/5 in all extremities. Tone is normal. No abnormal movements were seen on exam.    Sensory exam reveals normal sense of light touch in all extremities.    Deep tendon reflexes:  2+ throughout. Plantar responses are flexor. There  is no clonus.    Coordination: shows a normal finger-nose-finger. Normal rapidly alternating movements.    Gait: Deferred.       ANCILLARY DATA REVIEWED:     Lab Data Review:  No results found for this or any previous visit (from the past 24 hour(s)).    Lab Data Review:  Reviewed in chart.      Records reviewed:    Chart reviewed.      Imaging:    MRI brain w/wo 7/10/16:  1. MRI OF THE BRAIN WITHOUT AND WITH CONTRAST WITHIN NORMAL LIMITS.  2. NO EVIDENCE OF MASS LESION, HETEROTOPIC GRAY MATTER, GROSS CORTICAL DYSPLASIA, OR MESIAL TEMPORAL SCLEROSIS.      EEG, routine, 7/11/16:  This is a normal routine EEG recording in the awake only state. Clinical correlation is recommended.  Note: A normal EEG does not rule out epilepsy. If the clinical suspicion remains high for seizures, a prolonged recording to capture clinical or subclinical events may be helpful.          ASSESSMENT AND PLAN:    1. Localization-related epilepsy (HCC)      2. Mood disorder (HCC)        CLINICAL DISCUSSION:   At risk for MTLS, had febrile seizures as child.  New onset of seizures, three GTC seizures last year, started with head torsion to the right. Suspect focal onset with changes in awareness then progression to bilateral tonic clonic. At least one or two more spells, all after being vomiting for a few days and unable to keep meds down.  Was she withdrawing from xanax both times she had seizures? She had been vomiting for three days prior to each event.  Takes Keppra 750 mg po bid. She agrees to hold keppra starting on admission, goal to evaluate for seizure. She is aware of the consequences of having a seizure and would like to proceed. Will not stop xanax during this admission.    She had decided to skip her 24 hrs amb EEG and instead requested to come straight to EMU / 5 days VEEG to further characterize her spells. So far no spells, despite holding Keppra since admission.   Discussed possible side effects of xanax withdrawal including  seizures, ultimately may need to taper very slowly, she agrees to discuss after EMU.    On gabapentin and flexeril due to chronic back pain.    Nature of EMU admission discussed.    Agrees to call nursing staff for assistance when getting out of bed.    Agrees with low dose lovenox for DVT prophylaxis. I encourage moving lower extremities frequently and getting out of bed to recliner at least once a day.    Will wolfgang any spells for review.       FOLLOW-UP:    In my office, after EMU.        EDUCATION AND COUNSELING:  -Education was provided to the patient and/or family regarding diagnosis and prognosis. The chronic and unpredictable nature of the condition were discussed. There is increased risk for additional events, which may carry potential for significant injuries and death.    -We reviewed the current antiepileptic regimen. Potential side effects of antiepileptics were discussed at length, including but no limited to: hypersensitivity reactions (rash and others, some of which can be fatal), visual field changes (some of which may be irreversible), glaucoma, diplopia, kidney stones, osteopenia/osteoporosis/bone fractures, hyperthermia/anhydrosis, tremors/abnormal movements, ataxia, dizziness, fatigue, increased risk for falls, cardiac arrhythmias/syncope, gastrointestinal side effects(hepatitis, pancreatitis, gastritis, ulcers), gingival hypertrophy/bleeding, drowsiness, sedation, anxiety/nervousness, increased risk for suicide, increased risk for depression, and psychosis.    -We reviewed drug-drug interactions and their potential effect on seizure control and medication side effects.     -We also reviewed potential effects of seizures, epilepsy, and medications during pregnancy, including but not limited to fetal malformations, child developmental/intellectual disability, fetal/ risk for hemorrhages, stillbirth, maternal death, premature birth and others. The patient/family aware that pregnancy should  be avoided, unless desired, in which case we recommend discussing with us at least a year prior to planned conception. To avoid undesired pregnancy while on antiepileptics, we recommend dual contraception.    -Folic acid 2 mg is recommended for all females in childbearing age (12-44 years of age).    -Recommend vitamin D supplementation.    -Patient/family educated on risk for SUDEP (Sudden Death in Epilepsy). Counseling was provided on the importance of strict medication and follow up compliance. The patient/family understand the risks associated with non-adherence with the medical plan as outlined, including but not limited to an increased risk for breakthrough seizures, which may contribute to injuries, disability, status epilepticus, and even death.    -Counseling was also provided on potential effects of alcohol and other drugs, which may lower seizure threshold and/or affect the metabolism of antiepileptic drugs. We recommend avoidance of alcohol and illegal drugs. Denies use.    -We extensively discussed the aspects related to safety in drivers who suffer from epilepsy. The patient is encourage to report to the Division of Motor Vehicles of any condition and/or spells related to confusion, disorientation, and/or loss of awareness and/or loss of consciousness; as these may pose a safety issue if they occur while operating a motor vehicle. The patient and/or family are ultimately responsible for exercising caution and abiding to regulations in place. She wants to drive again. She states she is compliant with AED and has been seizure free for more than three months. She will be allowed to resume driving but understands she must stopped driving immediately should she has another seizure or she starts vomiting or for some reason unable to take either keppra or xanax. DMV form faxed.    -Other seizure precautions were discussed at length, including no diving, no skydiving, no climbing or exposure to unprotected  heights, no unsupervised swimming, no Jacuzzi or bathing in bathtubs or deep bodies of water.     Patient agrees with plan, as outlined.        John Strong MD  Medical Director, Epilepsy and Neurodiagnostics.   Clinical  of Neurology [facility] [email or web address]] of Medicine.   Diplomate in Neurology, Epilepsy, and Electrodiagnostic Medicine.   Office: [phone]  Fax: [phone]

## 2017-04-05 PROCEDURE — 700111 HCHG RX REV CODE 636 W/ 250 OVERRIDE (IP): Performed by: PSYCHIATRY & NEUROLOGY

## 2017-04-05 PROCEDURE — 700102 HCHG RX REV CODE 250 W/ 637 OVERRIDE(OP): Performed by: PSYCHIATRY & NEUROLOGY

## 2017-04-05 PROCEDURE — A9270 NON-COVERED ITEM OR SERVICE: HCPCS | Performed by: PSYCHIATRY & NEUROLOGY

## 2017-04-05 PROCEDURE — 95951 HCHG EEG-VIDEO-24HR: CPT

## 2017-04-05 PROCEDURE — 770020 HCHG ROOM/CARE - TELE (206)

## 2017-04-05 RX ADMIN — PAROXETINE HYDROCHLORIDE 20 MG: 20 TABLET, FILM COATED ORAL at 19:54

## 2017-04-05 RX ADMIN — ESTRADIOL 1.5 MG: 1 TABLET ORAL at 19:54

## 2017-04-05 RX ADMIN — OXYCODONE AND ACETAMINOPHEN 1 TABLET: 7.5; 325 TABLET ORAL at 17:36

## 2017-04-05 RX ADMIN — ENOXAPARIN SODIUM 40 MG: 100 INJECTION SUBCUTANEOUS at 09:05

## 2017-04-05 RX ADMIN — POTASSIUM CHLORIDE 10 MEQ: 750 TABLET, FILM COATED, EXTENDED RELEASE ORAL at 09:06

## 2017-04-05 RX ADMIN — GABAPENTIN 600 MG: 300 CAPSULE ORAL at 09:06

## 2017-04-05 RX ADMIN — CYCLOBENZAPRINE HYDROCHLORIDE 10 MG: 10 TABLET, FILM COATED ORAL at 13:02

## 2017-04-05 RX ADMIN — LISINOPRIL 10 MG: 10 TABLET ORAL at 09:00

## 2017-04-05 RX ADMIN — GABAPENTIN 600 MG: 300 CAPSULE ORAL at 14:35

## 2017-04-05 RX ADMIN — POTASSIUM CHLORIDE 10 MEQ: 750 TABLET, FILM COATED, EXTENDED RELEASE ORAL at 19:54

## 2017-04-05 RX ADMIN — GABAPENTIN 600 MG: 300 CAPSULE ORAL at 19:54

## 2017-04-05 RX ADMIN — ALPRAZOLAM 1 MG: 1 TABLET ORAL at 19:54

## 2017-04-05 RX ADMIN — OXYCODONE AND ACETAMINOPHEN 1 TABLET: 7.5; 325 TABLET ORAL at 09:06

## 2017-04-05 ASSESSMENT — PAIN SCALES - GENERAL
PAINLEVEL_OUTOF10: 5
PAINLEVEL_OUTOF10: 7
PAINLEVEL_OUTOF10: 7
PAINLEVEL_OUTOF10: ASSUMED PAIN PRESENT
PAINLEVEL_OUTOF10: 5
PAINLEVEL_OUTOF10: 5

## 2017-04-05 NOTE — PROGRESS NOTES
Cc: seizures.     Interim history:  Angelika Hamilton was electively admitted to the EMU. We are holding Keppra since admission. So far no spells. She continuous on her nocturnal dose of xanax.     She is eating her meals and drinking fluids without any problems.     She has no complaints.     Mood is good.     No reports of n or v.     No sob or chest pain. Has been moving her legs.         Past medical history:   No past medical history on file.    Past surgical history:   Past Surgical History   Procedure Laterality Date   • Hysterectomy, total abdominal         Family history:   Family History   Problem Relation Age of Onset   • Heart Disease Paternal Grandmother    • Stroke Paternal Grandmother    • Arthritis Paternal Grandmother    • Arthritis Mother    • Arthritis Father    • Arthritis Maternal Grandmother    • Arthritis Maternal Grandfather    • Arthritis Paternal Grandfather    • Cancer Paternal Grandfather        Social history:   Social History     Social History   • Marital Status:      Spouse Name: N/A   • Number of Children: N/A   • Years of Education: N/A     Occupational History   • Not on file.     Social History Main Topics   • Smoking status: Never Smoker    • Smokeless tobacco: Never Used   • Alcohol Use: 0.6 oz/week     1 Shots of liquor per week   • Drug Use: No   • Sexual Activity: Not on file     Other Topics Concern   • Not on file     Social History Narrative       Current medications:   Current Facility-Administered Medications   Medication Dose   • potassium chloride ER (KLOR-CON) tablet 10 mEq  10 mEq    Or   • potassium chloride (KLOR-CON) 20 MEQ packet 10 mEq  10 mEq   • lorazepam (ATIVAN) injection 1 mg  1 mg   • albuterol (PROVENTIL) 2.5mg/3ml nebulizer solution 2.5 mg  2.5 mg   • albuterol inhaler 2 Puff  2 Puff   • alprazolam (XANAX) tablet 1 mg  1 mg   • estradiol (ESTRACE) tablet 1.5 mg  1.5 mg   • gabapentin (NEURONTIN) capsule 600 mg  600 mg   • lisinopril (PRINIVIL)  10 MG tablet 10 mg  10 mg   • oxycodone-acetaminophen (PERCOCET) 7.5-325 MG per tablet 1 Tab  1 Tab   • paroxetine (PAXIL) tablet 20 mg  20 mg   • enoxaparin (LOVENOX) inj 40 mg  40 mg   • cyclobenzaprine (FLEXERIL) tablet 10 mg  10 mg       Medication Allergy:  Allergies   Allergen Reactions   • Sulfa Drugs Swelling     Patient states tongue swells   • Tape      Paper tape is fine         Review of systems:   Constitutional: denies fever, night sweats, weight loss, or malaise/fatigue.    Eyes: denies acute vision change, eye pain or secretion.    Ears, Nose, Mouth, Throat: denies nasal secretion, nasal bleeding, difficulty swallowing, hearing loss, tinnitus, vertigo, ear pain, oral ulcers or lesions.    Endocrine: denies recent weight changes, heat or cold intolerance, polyuria, polydypsia, polyphagia,abnormal hair growth.  Cardiovascular: denies new onset of chest pain, palpitations, syncope, lower extremity edema, or dyspnea of exertion.  Pulmonary: denies shortness of breath, new onset of cough, hemoptysis, wheezing, chest pain or flu-like symptoms.    GI: denies nausea, vomiting, diarrhea, GI bleeding, change in appetite, abdominal pain, and change in bowel habits.  : denies urinary incontinence, retention or hematuria.  Heme/oncology: denies history of easy bruising or bleeding. No history of cancer.    Allergy/immunology: denies hives/urticarial.  Dermatologic: denies new rash.  Musculoskeletal:denies joint swelling or pain, muscle pain, neck pain.    Neurologic: denies headaches, acute visual changes, facial droopiness, muscle weakness (focal or generalized), paresthesias, anesthesia, ataxia, change in speech or language, memory loss.  Psychiatric: denies symptoms of worsening of depression, hallucinations, suicidal or homicidal thoughts.          Physical examination:   Filed Vitals:    04/04/17 2000 04/05/17 0000 04/05/17 0400 04/05/17 0800   BP: 124/77 105/75 118/74 125/78   Pulse: 99 76 77 77   Temp:  36.2 °C (97.1 °F) 36.2 °C (97.1 °F) 36.2 °C (97.2 °F) 36.1 °C (97 °F)   Resp: 16 16 17 14   Height:       Weight:       SpO2: 94% 93% 92% 96%     General: Patient in no acute distress, pleasant and cooperative. Overweight.    HEENT: Normocephalic, no signs of acute trauma.    Neck: supple, no meningeal signs or carotid bruits. There is normal range of motion. No tenderness on exam.    Chest: clear to auscultation. No cough.    CV: RRR, no murmurs.    Skin: no signs of acute rashes or trauma.    Musculoskeletal: joints exhibit full range of motion, without any pain to palpation. There are no signs of joint or muscle swelling. There is no tenderness to deep palpation of muscles.    Psychiatric: No hallucinatory behavior. Denies symptoms of depression or suicidal ideation. Mood and affect appear normal on exam.     NEUROLOGICAL EXAM:    Mental status, orientation: Awake, alert and fully oriented.    Speech and language: speech is clear and fluent. The patient is able to name, repeat and comprehend.    Memory: There is intact recollection of recent and remote events.    Cranial nerve exam: Pupils are 3-4 mm bilaterally and equally reactive to light and accommodation. Visual fields are intact by confrontation. There is no nystagmus on primary or secondary gaze. Intact full EOM in all directions of gaze. Face appears symmetric. Sensation in the face is intact to light touch. Uvula is midline. Palate elevates symmetrically. Tongue is midline and without any signs of tongue biting or fasciculations. Sternocleidomastoid muscles exhibit is normal strength bilaterally. Shoulder shrug is intact bilaterally.    Motor exam: Strength is 5/5 in all extremities. Tone is normal. No abnormal movements were seen on exam.    Sensory exam reveals normal sense of light touch in all extremities.    Deep tendon reflexes:  2+ throughout. Plantar responses are flexor. There is no clonus.    Coordination: shows a normal finger-nose-finger.  Normal rapidly alternating movements.    Gait: Deferred.        ANCILLARY DATA REVIEWED:     Lab Data Review:  No results found for this or any previous visit (from the past 24 hour(s)).    Lab Data Review:  Reviewed in chart.      Records reviewed:    Chart reviewed.      Imaging:    MRI brain w/wo 7/10/16:  1. MRI OF THE BRAIN WITHOUT AND WITH CONTRAST WITHIN NORMAL LIMITS.  2. NO EVIDENCE OF MASS LESION, HETEROTOPIC GRAY MATTER, GROSS CORTICAL DYSPLASIA, OR MESIAL TEMPORAL SCLEROSIS.      EEG, routine, 7/11/16:  This is a normal routine EEG recording in the awake only state. Clinical correlation is recommended.  Note: A normal EEG does not rule out epilepsy. If the clinical suspicion remains high for seizures, a prolonged recording to capture clinical or subclinical events may be helpful.          ASSESSMENT AND PLAN:    1. Localization-related epilepsy (HCC)      2. Mood disorder (HCC)        CLINICAL DISCUSSION:   At risk for MTLS, had febrile seizures as child.   New onset of seizures, three GTC seizures last year, started with head torsion to the right. Suspect focal onset with changes in awareness then progression to bilateral tonic clonic. At least one or two more spells, all after being vomiting for a few days and unable to keep meds down.  Was she withdrawing from xanax both times she had seizures? She had been vomiting for three days prior to each event.  Takes Keppra regularly 750 mg po bid. She agreed to hold keppra starting on admission, goal to evaluate for seizure. She is aware of the consequences of having a seizure and would like to proceed. Will not stop xanax during this admission.    She had decided to skip her 24 hrs amb EEG and instead requested to come straight to EMU / 5 days VEEG to further characterize her spells. So far no spells, despite holding Keppra since admission.  VEEG so far is normal.   Discussed possible side effects of xanax withdrawal including seizures, ultimately may need  to taper very slowly, she agrees to discuss after EMU.    On gabapentin and flexeril due to chronic back pain.    Nature of EMU admission discussed.    Agrees to call nursing staff for assistance when getting out of bed.    Agrees with low dose lovenox for DVT prophylaxis. I encourage moving lower extremities frequently and getting out of bed to recliner at least once a day.    Will wolfgang any spells for review.       FOLLOW-UP:    In my office, after EMU.        EDUCATION AND COUNSELING:  -Education was provided to the patient and/or family regarding diagnosis and prognosis. The chronic and unpredictable nature of the condition were discussed. There is increased risk for additional events, which may carry potential for significant injuries and death.    -We reviewed the current antiepileptic regimen. Potential side effects of antiepileptics were discussed at length, including but no limited to: hypersensitivity reactions (rash and others, some of which can be fatal), visual field changes (some of which may be irreversible), glaucoma, diplopia, kidney stones, osteopenia/osteoporosis/bone fractures, hyperthermia/anhydrosis, tremors/abnormal movements, ataxia, dizziness, fatigue, increased risk for falls, cardiac arrhythmias/syncope, gastrointestinal side effects(hepatitis, pancreatitis, gastritis, ulcers), gingival hypertrophy/bleeding, drowsiness, sedation, anxiety/nervousness, increased risk for suicide, increased risk for depression, and psychosis.    -We reviewed drug-drug interactions and their potential effect on seizure control and medication side effects.     -We also reviewed potential effects of seizures, epilepsy, and medications during pregnancy, including but not limited to fetal malformations, child developmental/intellectual disability, fetal/ risk for hemorrhages, stillbirth, maternal death, premature birth and others. The patient/family aware that pregnancy should be avoided, unless desired, in  which case we recommend discussing with us at least a year prior to planned conception. To avoid undesired pregnancy while on antiepileptics, we recommend dual contraception.    -Folic acid 2 mg is recommended for all females in childbearing age (12-44 years of age).    -Recommend vitamin D supplementation.    -Patient/family educated on risk for SUDEP (Sudden Death in Epilepsy). Counseling was provided on the importance of strict medication and follow up compliance. The patient/family understand the risks associated with non-adherence with the medical plan as outlined, including but not limited to an increased risk for breakthrough seizures, which may contribute to injuries, disability, status epilepticus, and even death.    -Counseling was also provided on potential effects of alcohol and other drugs, which may lower seizure threshold and/or affect the metabolism of antiepileptic drugs. We recommend avoidance of alcohol and illegal drugs. Denies use.    -We extensively discussed the aspects related to safety in drivers who suffer from epilepsy. The patient is encourage to report to the Division of Motor Vehicles of any condition and/or spells related to confusion, disorientation, and/or loss of awareness and/or loss of consciousness; as these may pose a safety issue if they occur while operating a motor vehicle. The patient and/or family are ultimately responsible for exercising caution and abiding to regulations in place. She wants to drive again. She states she is compliant with AED and has been seizure free for more than three months. She is not driving and will remain as such for now.   -Other seizure precautions were discussed at length, including no diving, no skydiving, no climbing or exposure to unprotected heights, no unsupervised swimming, no Jacuzzi or bathing in bathtubs or deep bodies of water.     Patient agrees with plan, as outlined.          John Strong MD  Medical Director, Epilepsy and  Neurodiagnostics.   Clinical  of Neurology [facility].   Diplomate in Neurology, Epilepsy, and Electrodiagnostic Medicine.   Office: [phone]  Fax: [phone]

## 2017-04-05 NOTE — PROGRESS NOTES
"Angelika Reva Hamilton assessed after assuming care. no signs of acute distress and appears to be in stable condition. Last documented VS: /75 mmHg  Pulse 80  Temp(Src) 35.4 °C (95.7 °F)  Resp 15  Ht 1.651 m (5' 5\")  Wt 94.5 kg (208 lb 5.4 oz)  BMI 34.67 kg/m2  SpO2 95%      Mobility: Patient in bed at this time    Fall precautions in place. Hourly rounding in place and explained to Angelika. Educated Angelika on call light use as well as phone instructions to call RN or CNA directly. Possessions within patient's reach, fall precautions in place.     Angelika educated on Pain, Position, Potty, Priorities and instructed to notify RN if anything additional can be done to make stay more comfortable including linen change and toiletries.     Care plan:     Problem: Safety   Goal: Will remain free from falls   Outcome: PROGRESSING as EXPECTED   Angelika educated about fall risk. Will remain free from injury or falls. Appropriate side rails up. Bed in low position, call light and possions within reach, bed alarm in use. Hourly rounding in place.     Problem: Pain Management   Goal: Pain level will decrease to patient’s comfort goal   Outcome: PROGRESSING as EXPECTED   Following pain management plan, Angelika reports pain on 0-10 scale      *addendum to follow below at end or throughout shift if significant events occurred: if blank n/a    OVERNIGHT SIGNIFICANT EVENTS:  n/a      "

## 2017-04-06 PROCEDURE — 700102 HCHG RX REV CODE 250 W/ 637 OVERRIDE(OP): Performed by: PSYCHIATRY & NEUROLOGY

## 2017-04-06 PROCEDURE — 770020 HCHG ROOM/CARE - TELE (206)

## 2017-04-06 PROCEDURE — A9270 NON-COVERED ITEM OR SERVICE: HCPCS | Performed by: PSYCHIATRY & NEUROLOGY

## 2017-04-06 PROCEDURE — 95951 HCHG EEG-VIDEO-24HR: CPT

## 2017-04-06 PROCEDURE — 700111 HCHG RX REV CODE 636 W/ 250 OVERRIDE (IP): Performed by: PSYCHIATRY & NEUROLOGY

## 2017-04-06 RX ADMIN — OXYCODONE AND ACETAMINOPHEN 1 TABLET: 7.5; 325 TABLET ORAL at 17:50

## 2017-04-06 RX ADMIN — ALPRAZOLAM 1 MG: 1 TABLET ORAL at 20:28

## 2017-04-06 RX ADMIN — POTASSIUM CHLORIDE 10 MEQ: 750 TABLET, FILM COATED, EXTENDED RELEASE ORAL at 20:28

## 2017-04-06 RX ADMIN — ESTRADIOL 1.5 MG: 1 TABLET ORAL at 20:29

## 2017-04-06 RX ADMIN — CYCLOBENZAPRINE HYDROCHLORIDE 10 MG: 10 TABLET, FILM COATED ORAL at 01:27

## 2017-04-06 RX ADMIN — OXYCODONE AND ACETAMINOPHEN 1 TABLET: 7.5; 325 TABLET ORAL at 09:41

## 2017-04-06 RX ADMIN — CYCLOBENZAPRINE HYDROCHLORIDE 10 MG: 10 TABLET, FILM COATED ORAL at 20:28

## 2017-04-06 RX ADMIN — OXYCODONE AND ACETAMINOPHEN 1 TABLET: 7.5; 325 TABLET ORAL at 01:27

## 2017-04-06 RX ADMIN — GABAPENTIN 600 MG: 300 CAPSULE ORAL at 20:28

## 2017-04-06 RX ADMIN — GABAPENTIN 600 MG: 300 CAPSULE ORAL at 15:23

## 2017-04-06 RX ADMIN — LISINOPRIL 10 MG: 10 TABLET ORAL at 09:41

## 2017-04-06 RX ADMIN — ENOXAPARIN SODIUM 40 MG: 100 INJECTION SUBCUTANEOUS at 09:40

## 2017-04-06 RX ADMIN — POTASSIUM CHLORIDE 10 MEQ: 750 TABLET, FILM COATED, EXTENDED RELEASE ORAL at 09:41

## 2017-04-06 RX ADMIN — GABAPENTIN 600 MG: 300 CAPSULE ORAL at 09:40

## 2017-04-06 RX ADMIN — PAROXETINE HYDROCHLORIDE 20 MG: 20 TABLET, FILM COATED ORAL at 20:28

## 2017-04-06 ASSESSMENT — PAIN SCALES - GENERAL
PAINLEVEL_OUTOF10: 5
PAINLEVEL_OUTOF10: 8

## 2017-04-06 NOTE — CARE PLAN
Problem: Safety  Goal: Will remain free from injury  Outcome: PROGRESSING AS EXPECTED  Pt remains on EEG, and video monitoring.  She uses call light appropriately and is aware of falls risk.  Bed alarm in place.  No fall or injuries this shift.    Problem: Pain Management  Goal: Pain level will decrease to patient’s comfort goal  Outcome: PROGRESSING AS EXPECTED  Pt requiring q 8 hours percocet and flexeril for back pain.  She does ambulate a few times in room to stretch muscles, but c/o chronic back pain and states this is her at home dose.  No s/s of oversedation or complications with dosing.

## 2017-04-06 NOTE — PROGRESS NOTES
Cc: seizures.     Interim history:  Angelika Hamilton was electively admitted to the EMU. We are holding Keppra since admission. So far no spells. She continuous on her nocturnal dose of xanax.     She is eating her meals and drinking fluids without any problems.     She has no complaints.     Mood is good.     No reports of n or v.     No sob or chest pain. Has been moving her legs.         Past medical history:   Muscle spasms, seizures.     Past surgical history:   Past Surgical History   Procedure Laterality Date   • Hysterectomy, total abdominal         Family history:   Family History   Problem Relation Age of Onset   • Heart Disease Paternal Grandmother    • Stroke Paternal Grandmother    • Arthritis Paternal Grandmother    • Arthritis Mother    • Arthritis Father    • Arthritis Maternal Grandmother    • Arthritis Maternal Grandfather    • Arthritis Paternal Grandfather    • Cancer Paternal Grandfather        Social history:   Social History     Social History   • Marital Status:      Spouse Name: N/A   • Number of Children: N/A   • Years of Education: N/A     Occupational History   • Not on file.     Social History Main Topics   • Smoking status: Never Smoker    • Smokeless tobacco: Never Used   • Alcohol Use: 0.6 oz/week     1 Shots of liquor per week   • Drug Use: No   • Sexual Activity: Not on file     Other Topics Concern   • Not on file     Social History Narrative       Current medications:   Current Facility-Administered Medications   Medication Dose   • potassium chloride ER (KLOR-CON) tablet 10 mEq  10 mEq    Or   • potassium chloride (KLOR-CON) 20 MEQ packet 10 mEq  10 mEq   • lorazepam (ATIVAN) injection 1 mg  1 mg   • albuterol (PROVENTIL) 2.5mg/3ml nebulizer solution 2.5 mg  2.5 mg   • albuterol inhaler 2 Puff  2 Puff   • alprazolam (XANAX) tablet 1 mg  1 mg   • estradiol (ESTRACE) tablet 1.5 mg  1.5 mg   • gabapentin (NEURONTIN) capsule 600 mg  600 mg   • lisinopril (PRINIVIL) 10 MG  tablet 10 mg  10 mg   • oxycodone-acetaminophen (PERCOCET) 7.5-325 MG per tablet 1 Tab  1 Tab   • paroxetine (PAXIL) tablet 20 mg  20 mg   • enoxaparin (LOVENOX) inj 40 mg  40 mg   • cyclobenzaprine (FLEXERIL) tablet 10 mg  10 mg       Medication Allergy:  Allergies   Allergen Reactions   • Sulfa Drugs Swelling     Patient states tongue swells   • Tape      Paper tape is fine         Review of systems:   Constitutional: denies fever, night sweats, weight loss, or malaise/fatigue.    Eyes: denies acute vision change, eye pain or secretion.    Ears, Nose, Mouth, Throat: denies nasal secretion, nasal bleeding, difficulty swallowing, hearing loss, tinnitus, vertigo, ear pain, oral ulcers or lesions.    Endocrine: denies recent weight changes, heat or cold intolerance, polyuria, polydypsia, polyphagia,abnormal hair growth.  Cardiovascular: denies new onset of chest pain, palpitations, syncope, lower extremity edema, or dyspnea of exertion.  Pulmonary: denies shortness of breath, new onset of cough, hemoptysis, wheezing, chest pain or flu-like symptoms.    GI: denies nausea, vomiting, diarrhea, GI bleeding, change in appetite, abdominal pain, and change in bowel habits.  : denies urinary incontinence, retention or hematuria.  Heme/oncology: denies history of easy bruising or bleeding. No history of cancer.    Allergy/immunology: denies hives/urticarial.  Dermatologic: denies new rash.  Musculoskeletal:denies joint swelling or pain, muscle pain, neck pain.    Neurologic: denies headaches, acute visual changes, facial droopiness, muscle weakness (focal or generalized), paresthesias, anesthesia, ataxia, change in speech or language, memory loss.  Psychiatric: denies symptoms of worsening of depression, hallucinations, suicidal or homicidal thoughts.          Physical examination:   Filed Vitals:    04/05/17 2000 04/06/17 0000 04/06/17 0400 04/06/17 0800   BP: 107/74 123/83 104/73 108/61   Pulse: 88 91 76 69   Temp: 36.3 °C  (97.3 °F) 36.2 °C (97.1 °F) 36.2 °C (97.2 °F) 36 °C (96.8 °F)   Resp: 16 16 16 18   Height:       Weight:       SpO2: 93% 92% 92% 93%         General: Patient in no acute distress, pleasant and cooperative. Overweight.    HEENT: Normocephalic, no signs of acute trauma.    Neck: supple, no meningeal signs or carotid bruits. There is normal range of motion. No tenderness on exam.    Chest: clear to auscultation. No cough.    CV: RRR, no murmurs.    Skin: no signs of acute rashes or trauma.    Musculoskeletal: joints exhibit full range of motion, without any pain to palpation. There are no signs of joint or muscle swelling. There is no tenderness to deep palpation of muscles.    Psychiatric: No hallucinatory behavior. Denies symptoms of depression or suicidal ideation. Mood and affect appear normal on exam.     NEUROLOGICAL EXAM:    Mental status, orientation: Awake, alert and fully oriented.    Speech and language: speech is clear and fluent. The patient is able to name, repeat and comprehend.    Memory: There is intact recollection of recent and remote events.    Cranial nerve exam: Pupils are 3-4 mm bilaterally and equally reactive to light and accommodation. Visual fields are intact by confrontation. There is no nystagmus on primary or secondary gaze. Intact full EOM in all directions of gaze. Face appears symmetric. Sensation in the face is intact to light touch. Uvula is midline. Palate elevates symmetrically. Tongue is midline and without any signs of tongue biting or fasciculations. Sternocleidomastoid muscles exhibit is normal strength bilaterally. Shoulder shrug is intact bilaterally.    Motor exam: Strength is 5/5 in all extremities. Tone is normal. No abnormal movements were seen on exam.    Sensory exam reveals normal sense of light touch in all extremities.    Deep tendon reflexes:  2+ throughout. Plantar responses are flexor. There is no clonus.    Coordination: shows a normal finger-nose-finger. Normal  rapidly alternating movements.    Gait: Deferred.          ANCILLARY DATA REVIEWED:     Lab Data Review:  No results found for this or any previous visit (from the past 24 hour(s)).    Lab Data Review:  Reviewed in chart.      Records reviewed:    Chart reviewed.      Imaging:    MRI brain w/wo 7/10/16:  1. MRI OF THE BRAIN WITHOUT AND WITH CONTRAST WITHIN NORMAL LIMITS.  2. NO EVIDENCE OF MASS LESION, HETEROTOPIC GRAY MATTER, GROSS CORTICAL DYSPLASIA, OR MESIAL TEMPORAL SCLEROSIS.      EEG, routine, 7/11/16:  This is a normal routine EEG recording in the awake only state. Clinical correlation is recommended.  Note: A normal EEG does not rule out epilepsy. If the clinical suspicion remains high for seizures, a prolonged recording to capture clinical or subclinical events may be helpful.          ASSESSMENT AND PLAN:    1. Localization-related epilepsy (HCC)      2. Mood disorder (HCC)        CLINICAL DISCUSSION:   At risk for MTLS, had febrile seizures as child.    New onset of seizures, three GTC seizures last year, started with head torsion to the right. Suspect focal onset with changes in awareness then progression to bilateral tonic clonic. At least one or two more spells, all after being vomiting for a few days and unable to keep meds down.  Was she withdrawing from xanax both times she had seizures? She had been vomiting for three days prior to each event.  Takes Keppra regularly 750 mg po bid. She agreed to hold keppra starting on admission, goal to evaluate for seizure. She is aware of the consequences of having a seizure and would like to proceed. Will not stop xanax during this admission.    She had decided to skip her 24 hrs amb EEG and instead requested to come straight to EMU / 5 days VEEG to further characterize her spells. So far no spells, despite holding Keppra since admission.  VEEG so far is normal.    Discussed possible side effects of xanax withdrawal including seizures, ultimately may need to  taper very slowly, she agrees to discuss after EMU.    On gabapentin and flexeril due to chronic back pain.    Nature of EMU admission discussed.    Agrees to call nursing staff for assistance when getting out of bed.    Agrees with low dose lovenox for DVT prophylaxis. I encourage moving lower extremities frequently and getting out of bed to recliner at least once a day.    Will wolfgang any spells for review.       FOLLOW-UP:    In my office, after EMU.        EDUCATION AND COUNSELING:  -Education was provided to the patient and/or family regarding diagnosis and prognosis. The chronic and unpredictable nature of the condition were discussed. There is increased risk for additional events, which may carry potential for significant injuries and death.    -We reviewed the current antiepileptic regimen. Potential side effects of antiepileptics were discussed at length, including but no limited to: hypersensitivity reactions (rash and others, some of which can be fatal), visual field changes (some of which may be irreversible), glaucoma, diplopia, kidney stones, osteopenia/osteoporosis/bone fractures, hyperthermia/anhydrosis, tremors/abnormal movements, ataxia, dizziness, fatigue, increased risk for falls, cardiac arrhythmias/syncope, gastrointestinal side effects(hepatitis, pancreatitis, gastritis, ulcers), gingival hypertrophy/bleeding, drowsiness, sedation, anxiety/nervousness, increased risk for suicide, increased risk for depression, and psychosis.    -We reviewed drug-drug interactions and their potential effect on seizure control and medication side effects.     -We also reviewed potential effects of seizures, epilepsy, and medications during pregnancy, including but not limited to fetal malformations, child developmental/intellectual disability, fetal/ risk for hemorrhages, stillbirth, maternal death, premature birth and others. The patient/family aware that pregnancy should be avoided, unless desired, in  which case we recommend discussing with us at least a year prior to planned conception. To avoid undesired pregnancy while on antiepileptics, we recommend dual contraception.    -Folic acid 2 mg is recommended for all females in childbearing age (12-44 years of age).    -Recommend vitamin D supplementation.    -Patient/family educated on risk for SUDEP (Sudden Death in Epilepsy). Counseling was provided on the importance of strict medication and follow up compliance. The patient/family understand the risks associated with non-adherence with the medical plan as outlined, including but not limited to an increased risk for breakthrough seizures, which may contribute to injuries, disability, status epilepticus, and even death.    -Counseling was also provided on potential effects of alcohol and other drugs, which may lower seizure threshold and/or affect the metabolism of antiepileptic drugs. We recommend avoidance of alcohol and illegal drugs. Denies use.    -We extensively discussed the aspects related to safety in drivers who suffer from epilepsy. The patient is encourage to report to the Division of Motor Vehicles of any condition and/or spells related to confusion, disorientation, and/or loss of awareness and/or loss of consciousness; as these may pose a safety issue if they occur while operating a motor vehicle. The patient and/or family are ultimately responsible for exercising caution and abiding to regulations in place. She wants to drive again. She states she is compliant with AED and has been seizure free for more than three months. She is not driving and will remain as such for now.    -Other seizure precautions were discussed at length, including no diving, no skydiving, no climbing or exposure to unprotected heights, no unsupervised swimming, no Jacuzzi or bathing in bathtubs or deep bodies of water.     Patient agrees with plan, as outlined.        John Strong MD  Medical Director, Epilepsy and  Neurodiagnostics.   Clinical  of [facility] of [facility].   Diplomate in Neurology, Epilepsy, and Electrodiagnostic Medicine.   Office: [phone]  Fax: [phone]

## 2017-04-07 VITALS
TEMPERATURE: 96.3 F | RESPIRATION RATE: 16 BRPM | HEART RATE: 105 BPM | HEIGHT: 65 IN | OXYGEN SATURATION: 91 % | BODY MASS INDEX: 34.71 KG/M2 | WEIGHT: 208.34 LBS | DIASTOLIC BLOOD PRESSURE: 82 MMHG | SYSTOLIC BLOOD PRESSURE: 117 MMHG

## 2017-04-07 PROBLEM — R41.3 MEMORY LOSS: Chronic | Status: ACTIVE | Noted: 2017-04-07

## 2017-04-07 PROBLEM — R56.9 SEIZURE (HCC): Chronic | Status: ACTIVE | Noted: 2017-04-07

## 2017-04-07 PROCEDURE — 700102 HCHG RX REV CODE 250 W/ 637 OVERRIDE(OP): Performed by: PSYCHIATRY & NEUROLOGY

## 2017-04-07 PROCEDURE — A9270 NON-COVERED ITEM OR SERVICE: HCPCS | Performed by: PSYCHIATRY & NEUROLOGY

## 2017-04-07 PROCEDURE — 4A00X4Z MEASUREMENT OF CENTRAL NERVOUS ELECTRICAL ACTIVITY, EXTERNAL APPROACH: ICD-10-PCS | Performed by: PSYCHIATRY & NEUROLOGY

## 2017-04-07 PROCEDURE — 700111 HCHG RX REV CODE 636 W/ 250 OVERRIDE (IP): Performed by: PSYCHIATRY & NEUROLOGY

## 2017-04-07 RX ORDER — LEVETIRACETAM 250 MG/1
250 TABLET ORAL 2 TIMES DAILY
Qty: 21 TAB | Refills: 0 | Status: SHIPPED | OUTPATIENT
Start: 2017-04-07 | End: 2017-08-08

## 2017-04-07 RX ORDER — ALPRAZOLAM 1 MG/1
1 TABLET ORAL
Qty: 30 TAB | Refills: 3 | Status: SHIPPED | OUTPATIENT
Start: 2017-04-07 | End: 2017-08-08

## 2017-04-07 RX ADMIN — POTASSIUM CHLORIDE 10 MEQ: 750 TABLET, FILM COATED, EXTENDED RELEASE ORAL at 08:36

## 2017-04-07 RX ADMIN — ENOXAPARIN SODIUM 40 MG: 100 INJECTION SUBCUTANEOUS at 08:37

## 2017-04-07 RX ADMIN — OXYCODONE AND ACETAMINOPHEN 1 TABLET: 7.5; 325 TABLET ORAL at 08:37

## 2017-04-07 RX ADMIN — GABAPENTIN 600 MG: 300 CAPSULE ORAL at 08:36

## 2017-04-07 RX ADMIN — LISINOPRIL 10 MG: 10 TABLET ORAL at 08:37

## 2017-04-07 ASSESSMENT — PAIN SCALES - GENERAL
PAINLEVEL_OUTOF10: 8
PAINLEVEL_OUTOF10: 5

## 2017-04-07 NOTE — DISCHARGE INSTRUCTIONS
Discharge Instructions    Discharged to home by car with relative. Discharged via wheelchair, hospital escort: Yes.  Special equipment needed: Not Applicable    Be sure to schedule a follow-up appointment with your primary care doctor or any specialists as instructed.     Discharge Plan:   Influenza Vaccine Indication: Not indicated: Previously immunized this influenza season and > 8 years of age    I understand that a diet low in cholesterol, fat, and sodium is recommended for good health. Unless I have been given specific instructions below for another diet, I accept this instruction as my diet prescription.       Special Instructions: None    · Is patient discharged on Warfarin / Coumadin?   No     · Is patient Post Blood Transfusion?  No    Depression / Suicide Risk    As you are discharged from this [facility] facility, it is important to learn how to keep safe from harming yourself.    Recognize the warning signs:  · Abrupt changes in personality, positive or negative- including increase in energy   · Giving away possessions  · Change in eating patterns- significant weight changes-  positive or negative  · Change in sleeping patterns- unable to sleep or sleeping all the time   · Unwillingness or inability to communicate  · Depression  · Unusual sadness, discouragement and loneliness  · Talk of wanting to die  · Neglect of personal appearance   · Rebelliousness- reckless behavior  · Withdrawal from people/activities they love  · Confusion- inability to concentrate     If you or a loved one observes any of these behaviors or has concerns about self-harm, here's what you can do:  · Talk about it- your feelings and reasons for harming yourself  · Remove any means that you might use to hurt yourself (examples: pills, rope, extension cords, firearm)  · Get professional help from the community (Mental Health, Substance Abuse, psychological counseling)  · Do not be alone:Call your Safe Contact- someone whom you  trust who will be there for you.  · Call your local CRISIS HOTLINE 639-1981 or 954-328-3605  · Call your local Children's Mobile Crisis Response Team [place] (909) 326-2808 or www.Hapzing  · Call the toll free National Suicide Prevention Hotlines   · National Suicide Prevention Lifeline 875-174-VSBW (1428)  · National [employer] Line Network 800-SUICIDE (694-4781)

## 2017-04-07 NOTE — PROGRESS NOTES
Pt received discharge teaching from Dr. Strong. RN provided discharge paperwork, including info about follow-up appts and medication instructions.  PT verbalized understanding.  SHe refused a hospital escort and ambulated to her car with partner as her escort.
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
## 2017-04-07 NOTE — CARE PLAN
Problem: Safety  Goal: Will remain free from injury  Outcome: PROGRESSING AS EXPECTED  Pt not experiencing any seizure symptoms throughout shift.  Using call light and asking for help appropraitely.    Problem: Pain Management  Goal: Pain level will decrease to patient’s comfort goal  Outcome: PROGRESSING AS EXPECTED  Pt using percocet q 8 hours for chronic pain with fair effect.

## 2017-04-08 NOTE — PROGRESS NOTES
Monitor Summary: SR 86-98, CA 0.16, QRS 0.10, QT 0.34 with no ectopy per strip from monitor room    Dc'd from tele at 1348.

## 2017-04-10 ENCOUNTER — OFFICE VISIT (OUTPATIENT)
Dept: CARDIOLOGY | Facility: MEDICAL CENTER | Age: 42
End: 2017-04-10
Payer: COMMERCIAL

## 2017-04-10 VITALS
BODY MASS INDEX: 35.16 KG/M2 | HEIGHT: 65 IN | DIASTOLIC BLOOD PRESSURE: 84 MMHG | SYSTOLIC BLOOD PRESSURE: 124 MMHG | WEIGHT: 211 LBS | HEART RATE: 118 BPM | OXYGEN SATURATION: 93 %

## 2017-04-10 DIAGNOSIS — K52.9 CHRONIC DIARRHEA: ICD-10-CM

## 2017-04-10 DIAGNOSIS — E78.1 HYPERTRIGLYCERIDEMIA: ICD-10-CM

## 2017-04-10 PROCEDURE — 93000 ELECTROCARDIOGRAM COMPLETE: CPT | Performed by: INTERNAL MEDICINE

## 2017-04-10 PROCEDURE — 99214 OFFICE O/P EST MOD 30 MIN: CPT | Performed by: INTERNAL MEDICINE

## 2017-04-10 RX ORDER — POTASSIUM CHLORIDE 750 MG/1
20 TABLET, EXTENDED RELEASE ORAL
Refills: 0 | COMMUNITY
Start: 2017-04-04 | End: 2017-08-08

## 2017-04-10 RX ORDER — ONDANSETRON 4 MG/1
TABLET, FILM COATED ORAL
Refills: 0 | COMMUNITY
Start: 2017-03-31 | End: 2017-08-08 | Stop reason: SDUPTHER

## 2017-04-10 RX ORDER — BACLOFEN 10 MG/1
10 TABLET ORAL 3 TIMES DAILY
Refills: 0 | COMMUNITY
Start: 2017-02-15 | End: 2017-08-08

## 2017-04-10 ASSESSMENT — ENCOUNTER SYMPTOMS
DEPRESSION: 0
HEADACHES: 0
LOSS OF CONSCIOUSNESS: 0
BRUISES/BLEEDS EASILY: 0
ABDOMINAL PAIN: 0
ORTHOPNEA: 0
SPEECH CHANGE: 0
CHILLS: 0
COUGH: 0
PND: 0
SHORTNESS OF BREATH: 0
FALLS: 0
HALLUCINATIONS: 0
SENSORY CHANGE: 0
CLAUDICATION: 0
BLOOD IN STOOL: 0
NAUSEA: 0
PALPITATIONS: 0
MYALGIAS: 0
EYE DISCHARGE: 0
BLURRED VISION: 0
EYE PAIN: 0
DIZZINESS: 0
VOMITING: 0
FEVER: 0
DOUBLE VISION: 0
WEIGHT LOSS: 0

## 2017-04-10 NOTE — Clinical Note
[facility]   [address], [address]  [place] [address]  Phone: [phone]  Fax: [phone]              Angelika Hamilton  1975    Encounter Date: 4/10/2017    Catarina Santoro M.D.          PROGRESS NOTE:  Subjective:   Angelika Hamilton is a 41 y.o. female who presents today for cardiac care in follow-up for prior episodes of syncope which was thought to be related to seizure disorder. We did her cardiac workup with Holter monitor for 3 weeks which did not show any evidence of malignant arrhythmias.     In the interim, patient has been doing well without having any symptoms. Patient denies having chest pain, dyspnea, palpitation, presyncope, syncope episodes. Able to climb up at least 2 flights of stairs.      History reviewed. No pertinent past medical history.  Past Surgical History   Procedure Laterality Date   • Hysterectomy, total abdominal       Family History   Problem Relation Age of Onset   • Heart Disease Paternal Grandmother    • Stroke Paternal Grandmother    • Arthritis Paternal Grandmother    • Arthritis Mother    • Arthritis Father    • Arthritis Maternal Grandmother    • Arthritis Maternal Grandfather    • Arthritis Paternal Grandfather    • Cancer Paternal Grandfather      History   Smoking status   • Never Smoker    Smokeless tobacco   • Never Used     Allergies   Allergen Reactions   • Sulfa Drugs Swelling     Patient states tongue swells   • Tape      Paper tape is fine       Outpatient Encounter Prescriptions as of 4/10/2017   Medication Sig Dispense Refill   • alprazolam (XANAX) 1 MG Tab Take 1 Tab by mouth every bedtime. Take 1 tab po qhs for 4 weeks, then lower to 0.5 tab po qhs thereafter. 30 Tab 3   • levetiracetam (KEPPRA) 250 MG tablet Take 1 Tab by mouth 2 times a day. Take 1 tab po bid for one week, then lower to 1 tab po qhs for one week, then discontinue. 21 Tab 0   • cyclobenzaprine (FLEXERIL) 10 MG Tab Take 10 mg by  mouth 3 times a day as needed.     • potassium chloride ER (KLOR-CON) 10 MEQ tablet Take 10 mEq by mouth 2 times a day.     • paroxetine (PAXIL) 20 MG Tab Take 20 mg by mouth every day.     • lisinopril (PRINIVIL) 10 MG Tab Take 10 mg by mouth every day.     • oxycodone-acetaminophen (PERCOCET) 7.5-325 MG per tablet Take 1 Tab by mouth every 8 hours as needed for Severe Pain. 1 Tab 0   • gabapentin (NEURONTIN) 600 MG tablet Take 600 mg by mouth 3 times a day.     • estradiol (ESTRACE) 1 MG Tab Take 1.5 mg by mouth every day.     • baclofen (LIORESAL) 10 MG Tab Take 10 mg by mouth 3 times a day.  0   • potassium chloride SA (K-DUR) 10 MEQ Tab CR Take 20 mEq by mouth.  0   • ondansetron (ZOFRAN) 4 MG Tab tablet TAKE 1 TAB BY MOUTH EVERY 8 HOURS FOR 3 DAYS AS NEEDED FOR NAUSEA  0   • [DISCONTINUED] Fluticasone Furoate (ARNUITY ELLIPTA) 200 MCG/ACT AEROSOL POWDER, BREATH ACTIVATED Inhale  by mouth.     • [DISCONTINUED] levetiracetam (KEPPRA) 750 MG tablet Take 1 Tab by mouth 2 times a day. 60 Tab 11   • [DISCONTINUED] NYSTATIN, TOPICAL, EX by Apply externally route.     • [DISCONTINUED] baclofen (LIORESAL) 10 MG Tab Take 10 mg by mouth 3 times a day.     • [DISCONTINUED] Azelastine-Fluticasone (DYMISTA NA) Spray  in nose.     • [DISCONTINUED] Fluticasone Furoate 200 MCG/ACT AEROSOL POWDER, BREATH ACTIVATED Inhale 1 Puff by mouth every day.     • [DISCONTINUED] albuterol (PROVENTIL) 2.5mg/3ml Nebu Soln solution for nebulization 2.5 mg by Nebulization route every four hours as needed for Shortness of Breath.     • [DISCONTINUED] sumatriptan (IMITREX) 25 MG Tab Take 25-50 mg by mouth Once PRN for Migraine.     • [DISCONTINUED] albuterol 108 (90 BASE) MCG/ACT Aero Soln inhalation aerosol Inhale 2 Puffs by mouth every 6 hours as needed for Shortness of Breath. 8.5 g 1   • [DISCONTINUED] alprazolam (XANAX) 1 MG Tab Take 1 mg by mouth at bedtime as needed for Sleep or Anxiety.       No facility-administered encounter  "medications on file as of 4/10/2017.     Review of Systems   Constitutional: Negative for fever, chills, weight loss and malaise/fatigue.   HENT: Negative for ear discharge, ear pain, hearing loss and nosebleeds.    Eyes: Negative for blurred vision, double vision, pain and discharge.   Respiratory: Negative for cough and shortness of breath.    Cardiovascular: Negative for chest pain, palpitations, orthopnea, claudication, leg swelling and PND.   Gastrointestinal: Negative for nausea, vomiting, abdominal pain, blood in stool and melena.   Genitourinary: Negative for dysuria and hematuria.   Musculoskeletal: Negative for myalgias, joint pain and falls.   Skin: Negative for itching and rash.   Neurological: Negative for dizziness, sensory change, speech change, loss of consciousness and headaches.   Endo/Heme/Allergies: Negative for environmental allergies. Does not bruise/bleed easily.   Psychiatric/Behavioral: Negative for depression, suicidal ideas and hallucinations.        Objective:   /84 mmHg  Pulse 118  Ht 1.651 m (5' 5\")  Wt 95.709 kg (211 lb)  BMI 35.11 kg/m2  SpO2 93%    Physical Exam   Constitutional: She is oriented to person, place, and time. She appears well-developed and well-nourished.   HENT:   Head: Normocephalic and atraumatic.   Eyes: EOM are normal.   Neck: No JVD present.   Cardiovascular: Normal rate, regular rhythm, normal heart sounds and intact distal pulses.  Exam reveals no gallop and no friction rub.    No murmur heard.  Pulmonary/Chest: No respiratory distress. She has no wheezes. She has no rales. She exhibits no tenderness.   Abdominal: She exhibits no distension. There is no tenderness. There is no rebound and no guarding.   Musculoskeletal: She exhibits no edema or tenderness.   Lymphadenopathy:     She has no cervical adenopathy.   Neurological: She is alert and oriented to person, place, and time.   Skin: Skin is dry.   Psychiatric: She has a normal mood and affect.   "   Nursing note and vitals reviewed.      Assessment:     1. Hypertriglyceridemia     2. Chronic diarrhea         Medical Decision Making:  Today's Assessment / Status / Plan:     No further cardiac testing at this time.  Patient will complete her neurological workup with a neurologist.    I will see patient back in clinic with lab tests and studies results in 12 months.    I thank you Dr. Cat for referring patient to our Cardiology Clinic today.        Chaitanya Cat PA-C  [address] [facility] [address]  VIA Facsimile: [phone]

## 2017-04-10 NOTE — MR AVS SNAPSHOT
"        Angelika Hamilton   4/10/2017 1:00 PM   Office Visit   MRN: [id number]    Department:  [facility]   Dept Phone:  [phone]    Description:  Female : 1975   Provider:  Catarina Santoro M.D.           Reason for Visit     Follow-Up           Allergies as of 4/10/2017     Allergen Noted Reactions    Sulfa Drugs 2015   Swelling    Patient states tongue swells    Tape 2015       Paper tape is fine        You were diagnosed with     Hypertriglyceridemia   [143240]       Chronic diarrhea   [903067]         Vital Signs     Blood Pressure Pulse Height Weight Body Mass Index Oxygen Saturation    124/84 mmHg 118 1.651 m (5' 5\") 95.709 kg (211 lb) 35.11 kg/m2 93%    Smoking Status                   Never Smoker            Basic Information     Date Of Birth Sex Race Ethnicity Preferred Language    1975 Female White Non- English      Problem List              ICD-10-CM Priority Class Noted - Resolved    Hormone replacement therapy (HRT) Z79.890   2/10/2016 - Present    Chronic back pain M54.9, G89.29   2/10/2016 - Present    Anxiety F41.9   2/10/2016 - Present    Hypertension I10   2/10/2016 - Present    Obesity E66.9   2/10/2016 - Present    Insomnia G47.00   2/10/2016 - Present    Chronic diarrhea K52.9   2/10/2016 - Present    Elevated C-reactive protein (CRP) R79.82   3/17/2016 - Present    Chronic fatigue and malaise R53.82, R53.81   3/20/2016 - Present    Hypertriglyceridemia E78.1   3/20/2016 - Present    New onset seizure (CMS-HCC) R56.9   2016 - Present    Headache R51   7/10/2016 - Present    Elevated CSF protein R83.8   7/10/2016 - Present    Seizure (CMS-HCC) (Chronic) R56.9   2017 - Present    Memory loss (Chronic) R41.3   2017 - Present      Health Maintenance        Date Due Completion Dates    IMM DTaP/Tdap/Td Vaccine (1 - Tdap) 1993 ---    PAP SMEAR 1995 ---    MAMMOGRAM 2014 ---            Results       Current Immunizations    " Influenza Vaccine Quad Inj (Pf) 10/1/2016      Below and/or attached are the medications your provider expects you to take. Review all of your home medications and newly ordered medications with your provider and/or pharmacist. Follow medication instructions as directed by your provider and/or pharmacist. Please keep your medication list with you and share with your provider. Update the information when medications are discontinued, doses are changed, or new medications (including over-the-counter products) are added; and carry medication information at all times in the event of emergency situations     Allergies:  SULFA DRUGS - Swelling     TAPE - (reactions not documented)               Medications  Valid as of: April 10, 2017 -  1:21 PM    Generic Name Brand Name Tablet Size Instructions for use    ALPRAZolam (Tab) XANAX 1 MG Take 1 Tab by mouth every bedtime. Take 1 tab po qhs for 4 weeks, then lower to 0.5 tab po qhs thereafter.        Baclofen (Tab) LIORESAL 10 MG Take 10 mg by mouth 3 times a day.        Cyclobenzaprine HCl (Tab) FLEXERIL 10 MG Take 10 mg by mouth 3 times a day as needed.        Estradiol (Tab) ESTRACE 1 MG Take 1.5 mg by mouth every day.        Gabapentin (Tab) NEURONTIN 600 MG Take 600 mg by mouth 3 times a day.        LevETIRAcetam (Tab) KEPPRA 250 MG Take 1 Tab by mouth 2 times a day. Take 1 tab po bid for one week, then lower to 1 tab po qhs for one week, then discontinue.        Lisinopril (Tab) PRINIVIL 10 MG Take 10 mg by mouth every day.        Ondansetron HCl (Tab) ZOFRAN 4 MG TAKE 1 TAB BY MOUTH EVERY 8 HOURS FOR 3 DAYS AS NEEDED FOR NAUSEA        Oxycodone-Acetaminophen (Tab) PERCOCET 7.5-325 MG Take 1 Tab by mouth every 8 hours as needed for Severe Pain.        PARoxetine HCl (Tab) PAXIL 20 MG Take 20 mg by mouth every day.        Potassium Chloride (Tab CR) KLOR-CON 10 MEQ Take 10 mEq by mouth 2 times a day.        Potassium Chloride Sofia CR (Tab CR) K-DUR 10 MEQ Take 20 mEq by  mouth.        .                 Medicines prescribed today were sent to:     [facility]/PHARMACY #[id number] - [place], [place] - [address]    [address] [place] [address]    Phone: [phone] Fax: [phone]    Open 24 Hours?: No      Medication refill instructions:       If your prescription bottle indicates you have medication refills left, it is not necessary to call your provider’s office. Please contact your pharmacy and they will refill your medication.    If your prescription bottle indicates you do not have any refills left, you may request refills at any time through one of the following ways: The online [employer] system (except Urgent Care), by calling your provider’s office, or by asking your pharmacy to contact your provider’s office with a refill request. Medication refills are processed only during regular business hours and may not be available until the next business day. Your provider may request additional information or to have a follow-up visit with you prior to refilling your medication.   *Please Note: Medication refills are assigned a new Rx number when refilled electronically. Your pharmacy may indicate that no refills were authorized even though a new prescription for the same medication is available at the pharmacy. Please request the medicine by name with the pharmacy before contacting your provider for a refill.           [employer] Access Code: Activation code not generated  Current [employer] Status: Active

## 2017-04-10 NOTE — PROGRESS NOTES
Subjective:   Angelika Hamilton is a 41 y.o. female who presents today for cardiac care in follow-up for prior episodes of syncope which was thought to be related to seizure disorder. We did her cardiac workup with Holter monitor for 3 weeks which did not show any evidence of malignant arrhythmias.     In the interim, patient has been doing well without having any symptoms. Patient denies having chest pain, dyspnea, palpitation, presyncope, syncope episodes. Able to climb up at least 2 flights of stairs.    I have reviewed patient's ECG, which shows normal sinus rhythm, normal DE, QT intervals. No evidence of acute coronary syndrome.    History reviewed. No pertinent past medical history.  Past Surgical History   Procedure Laterality Date   • Hysterectomy, total abdominal       Family History   Problem Relation Age of Onset   • Heart Disease Paternal Grandmother    • Stroke Paternal Grandmother    • Arthritis Paternal Grandmother    • Arthritis Mother    • Arthritis Father    • Arthritis Maternal Grandmother    • Arthritis Maternal Grandfather    • Arthritis Paternal Grandfather    • Cancer Paternal Grandfather      History   Smoking status   • Never Smoker    Smokeless tobacco   • Never Used     Allergies   Allergen Reactions   • Sulfa Drugs Swelling     Patient states tongue swells   • Tape      Paper tape is fine       Outpatient Encounter Prescriptions as of 4/10/2017   Medication Sig Dispense Refill   • alprazolam (XANAX) 1 MG Tab Take 1 Tab by mouth every bedtime. Take 1 tab po qhs for 4 weeks, then lower to 0.5 tab po qhs thereafter. 30 Tab 3   • levetiracetam (KEPPRA) 250 MG tablet Take 1 Tab by mouth 2 times a day. Take 1 tab po bid for one week, then lower to 1 tab po qhs for one week, then discontinue. 21 Tab 0   • cyclobenzaprine (FLEXERIL) 10 MG Tab Take 10 mg by mouth 3 times a day as needed.     • potassium chloride ER (KLOR-CON) 10 MEQ tablet Take 10 mEq by mouth 2 times a day.     • paroxetine  (PAXIL) 20 MG Tab Take 20 mg by mouth every day.     • lisinopril (PRINIVIL) 10 MG Tab Take 10 mg by mouth every day.     • oxycodone-acetaminophen (PERCOCET) 7.5-325 MG per tablet Take 1 Tab by mouth every 8 hours as needed for Severe Pain. 1 Tab 0   • gabapentin (NEURONTIN) 600 MG tablet Take 600 mg by mouth 3 times a day.     • estradiol (ESTRACE) 1 MG Tab Take 1.5 mg by mouth every day.     • baclofen (LIORESAL) 10 MG Tab Take 10 mg by mouth 3 times a day.  0   • potassium chloride SA (K-DUR) 10 MEQ Tab CR Take 20 mEq by mouth.  0   • ondansetron (ZOFRAN) 4 MG Tab tablet TAKE 1 TAB BY MOUTH EVERY 8 HOURS FOR 3 DAYS AS NEEDED FOR NAUSEA  0   • [DISCONTINUED] Fluticasone Furoate (ARNUITY ELLIPTA) 200 MCG/ACT AEROSOL POWDER, BREATH ACTIVATED Inhale  by mouth.     • [DISCONTINUED] levetiracetam (KEPPRA) 750 MG tablet Take 1 Tab by mouth 2 times a day. 60 Tab 11   • [DISCONTINUED] NYSTATIN, TOPICAL, EX by Apply externally route.     • [DISCONTINUED] baclofen (LIORESAL) 10 MG Tab Take 10 mg by mouth 3 times a day.     • [DISCONTINUED] Azelastine-Fluticasone (DYMISTA NA) Spray  in nose.     • [DISCONTINUED] Fluticasone Furoate 200 MCG/ACT AEROSOL POWDER, BREATH ACTIVATED Inhale 1 Puff by mouth every day.     • [DISCONTINUED] albuterol (PROVENTIL) 2.5mg/3ml Nebu Soln solution for nebulization 2.5 mg by Nebulization route every four hours as needed for Shortness of Breath.     • [DISCONTINUED] sumatriptan (IMITREX) 25 MG Tab Take 25-50 mg by mouth Once PRN for Migraine.     • [DISCONTINUED] albuterol 108 (90 BASE) MCG/ACT Aero Soln inhalation aerosol Inhale 2 Puffs by mouth every 6 hours as needed for Shortness of Breath. 8.5 g 1   • [DISCONTINUED] alprazolam (XANAX) 1 MG Tab Take 1 mg by mouth at bedtime as needed for Sleep or Anxiety.       No facility-administered encounter medications on file as of 4/10/2017.     Review of Systems   Constitutional: Negative for fever, chills, weight loss and malaise/fatigue.   HENT:  "Negative for ear discharge, ear pain, hearing loss and nosebleeds.    Eyes: Negative for blurred vision, double vision, pain and discharge.   Respiratory: Negative for cough and shortness of breath.    Cardiovascular: Negative for chest pain, palpitations, orthopnea, claudication, leg swelling and PND.   Gastrointestinal: Negative for nausea, vomiting, abdominal pain, blood in stool and melena.   Genitourinary: Negative for dysuria and hematuria.   Musculoskeletal: Negative for myalgias, joint pain and falls.   Skin: Negative for itching and rash.   Neurological: Negative for dizziness, sensory change, speech change, loss of consciousness and headaches.   Endo/Heme/Allergies: Negative for environmental allergies. Does not bruise/bleed easily.   Psychiatric/Behavioral: Negative for depression, suicidal ideas and hallucinations.        Objective:   /84 mmHg  Pulse 118  Ht 1.651 m (5' 5\")  Wt 95.709 kg (211 lb)  BMI 35.11 kg/m2  SpO2 93%    Physical Exam   Constitutional: She is oriented to person, place, and time. She appears well-developed and well-nourished.   HENT:   Head: Normocephalic and atraumatic.   Eyes: EOM are normal.   Neck: No JVD present.   Cardiovascular: Normal rate, regular rhythm, normal heart sounds and intact distal pulses.  Exam reveals no gallop and no friction rub.    No murmur heard.  Pulmonary/Chest: No respiratory distress. She has no wheezes. She has no rales. She exhibits no tenderness.   Abdominal: She exhibits no distension. There is no tenderness. There is no rebound and no guarding.   Musculoskeletal: She exhibits no edema or tenderness.   Lymphadenopathy:     She has no cervical adenopathy.   Neurological: She is alert and oriented to person, place, and time.   Skin: Skin is dry.   Psychiatric: She has a normal mood and affect.   Nursing note and vitals reviewed.      Assessment:     1. Hypertriglyceridemia     2. Chronic diarrhea         Medical Decision Making:  Today's " Assessment / Status / Plan:     No further cardiac testing at this time.  Patient will complete her neurological workup with a neurologist.    I will see patient back in clinic with lab tests and studies results in 12 months.    I thank you Dr. Cat for referring patient to our Cardiology Clinic today.

## 2017-04-11 LAB — EKG IMPRESSION: NORMAL

## 2017-04-18 ENCOUNTER — OFFICE VISIT (OUTPATIENT)
Dept: URGENT CARE | Facility: PHYSICIAN GROUP | Age: 42
End: 2017-04-18
Payer: COMMERCIAL

## 2017-04-18 VITALS
RESPIRATION RATE: 16 BRPM | HEART RATE: 111 BPM | OXYGEN SATURATION: 95 % | HEIGHT: 65 IN | DIASTOLIC BLOOD PRESSURE: 84 MMHG | BODY MASS INDEX: 35.32 KG/M2 | WEIGHT: 212 LBS | TEMPERATURE: 98.9 F | SYSTOLIC BLOOD PRESSURE: 126 MMHG

## 2017-04-18 DIAGNOSIS — S20.00XA BRUISE OF BREAST: ICD-10-CM

## 2017-04-18 PROCEDURE — 99214 OFFICE O/P EST MOD 30 MIN: CPT | Performed by: PHYSICIAN ASSISTANT

## 2017-04-18 NOTE — PROGRESS NOTES
Chief Complaint   Patient presents with   • Breast Mass     Pt states has had Dennis with no findings, x3days brusing       HISTORY OF PRESENT ILLNESS: Patient is a 41 y.o. female who presents today because she has bruising on the medial aspect of her left breast. She notes that she has had scar tissue in that area, has had recent mammograms and has been following up with her primary care provider a regular basis, all with benign findings so far. Nonetheless, she was pushing on the area a few days ago and she felt a small popping sensation in the area and she developed some bruising. She outlined with a marker and over the last 2 days it has slowly faded a little bit, as well as migrated inferiorly from the original marked off area. No pain. She has not been taking any medications for    Patient Active Problem List    Diagnosis Date Noted   • Seizure (CMS-[facility]) 04/07/2017   • Memory loss 04/07/2017   • Headache 07/10/2016   • Elevated CSF protein 07/10/2016   • New onset seizure (CMS-HCC) 07/09/2016   • Chronic fatigue and malaise 03/20/2016   • Hypertriglyceridemia 03/20/2016   • Elevated C-reactive protein (CRP) 03/17/2016   • Hormone replacement therapy (HRT) 02/10/2016   • Chronic back pain 02/10/2016   • Anxiety 02/10/2016   • Hypertension 02/10/2016   • Obesity 02/10/2016   • Insomnia 02/10/2016   • Chronic diarrhea 02/10/2016       Allergies:Sulfa drugs and Tape    Current Outpatient Prescriptions Ordered in [facility]   Medication Sig Dispense Refill   • potassium chloride SA (K-DUR) 10 MEQ Tab CR Take 20 mEq by mouth.  0   • ondansetron (ZOFRAN) 4 MG Tab tablet TAKE 1 TAB BY MOUTH EVERY 8 HOURS FOR 3 DAYS AS NEEDED FOR NAUSEA  0   • alprazolam (XANAX) 1 MG Tab Take 1 Tab by mouth every bedtime. Take 1 tab po qhs for 4 weeks, then lower to 0.5 tab po qhs thereafter. 30 Tab 3   • levetiracetam (KEPPRA) 250 MG tablet Take 1 Tab by mouth 2 times a day. Take 1 tab po bid for one week, then lower to 1 tab po qhs for one  week, then discontinue. 21 Tab 0   • cyclobenzaprine (FLEXERIL) 10 MG Tab Take 10 mg by mouth 3 times a day as needed.     • potassium chloride ER (KLOR-CON) 10 MEQ tablet Take 10 mEq by mouth 2 times a day.     • paroxetine (PAXIL) 20 MG Tab Take 20 mg by mouth every day.     • lisinopril (PRINIVIL) 10 MG Tab Take 10 mg by mouth every day.     • oxycodone-acetaminophen (PERCOCET) 7.5-325 MG per tablet Take 1 Tab by mouth every 8 hours as needed for Severe Pain. 1 Tab 0   • gabapentin (NEURONTIN) 600 MG tablet Take 600 mg by mouth 3 times a day.     • estradiol (ESTRACE) 1 MG Tab Take 1.5 mg by mouth every day.     • baclofen (LIORESAL) 10 MG Tab Take 10 mg by mouth 3 times a day.  0     No current [facility]-ordered facility-administered medications on file.       History reviewed. No pertinent past medical history.    Social History   Substance Use Topics   • Smoking status: Never Smoker    • Smokeless tobacco: Never Used   • Alcohol Use: 0.6 oz/week     1 Shots of liquor per week       Family Status   Relation Status Death Age   • Paternal Grandmother     • Mother Alive    • Father Alive    • Sister Alive    • Brother Alive    • Maternal Aunt Alive    • Maternal Uncle Alive    • Paternal Aunt Alive    • Paternal Uncle Alive    • Maternal Grandmother     • Maternal Grandfather     • Paternal Grandfather       Family History   Problem Relation Age of Onset   • Heart Disease Paternal Grandmother    • Stroke Paternal Grandmother    • Arthritis Paternal Grandmother    • Arthritis Mother    • Arthritis Father    • Arthritis Maternal Grandmother    • Arthritis Maternal Grandfather    • Arthritis Paternal Grandfather    • Cancer Paternal Grandfather        ROS:  Review of Systems   Constitutional: Negative for fever, chills, weight loss and malaise/fatigue.   HENT: Negative for ear pain, nosebleeds, congestion, sore throat and neck pain.    Eyes: Negative for blurred vision.   Respiratory:  "Negative for cough, sputum production, shortness of breath and wheezing.    Cardiovascular: Negative for chest pain, palpitations, orthopnea and leg swelling.   Gastrointestinal: Negative for heartburn, nausea, vomiting and abdominal pain.       Exam:  Blood pressure 126/84, pulse 111, temperature 37.2 °C (98.9 °F), resp. rate 16, height 1.651 m (5' 5\"), weight 96.163 kg (212 lb), SpO2 95 %, not currently breastfeeding.  General:  Well nourished, well developed female in NAD  Head:Normocephalic, atraumatic  Eyes: PERRLA, EOM within normal limits, no conjunctival injection, no scleral icterus, visual fields and acuity grossly intact.  Extremities: no clubbing, cyanosis, or edema.  Breast: Medial aspect of left breast has a 6-8 cm diameter area of bruising in various stages. It is approximately 2 cm lower than the originally marked off area.    Please note that this dictation was created using voice recognition software. I have made every reasonable attempt to correct obvious errors, but I expect that there are errors of grammar and possibly content that I did not discover before finalizing the note.    Assessment/Plan:  1. Bruise of breast      reassured patient, apply heat.    Followup with primary care in the next 7-10 days if not significantly improving, return to the urgent care or go to the emergency room sooner for any worsening of symptoms.         "

## 2017-04-18 NOTE — MR AVS SNAPSHOT
"        Angelika Hamilton   2017 3:15 PM   Office Visit   MRN: [id number]    Department:  [facility]   Dept Phone:  [phone]    Description:  Female : 1975   Provider:  Erik Martin PA-C           Reason for Visit     Breast Mass Pt states has had Dennis with no findings, x3days brusing      Allergies as of 2017     Allergen Noted Reactions    Sulfa Drugs 2015   Swelling    Patient states tongue swells    Tape 2015       Paper tape is fine        You were diagnosed with     Bruise of breast   [[id number]]         Vital Signs     Blood Pressure Pulse Temperature Respirations Height Weight    126/84 mmHg 111 37.2 °C (98.9 °F) 16 1.651 m (5' 5\") 96.163 kg (212 lb)    Body Mass Index Oxygen Saturation Breastfeeding? Smoking Status          35.28 kg/m2 95% No Never Smoker         Basic Information     Date Of Birth Sex Race Ethnicity Preferred Language    1975 Female White Non- English      Problem List              ICD-10-CM Priority Class Noted - Resolved    Hormone replacement therapy (HRT) Z79.890   2/10/2016 - Present    Chronic back pain M54.9, G89.29   2/10/2016 - Present    Anxiety F41.9   2/10/2016 - Present    Hypertension I10   2/10/2016 - Present    Obesity E66.9   2/10/2016 - Present    Insomnia G47.00   2/10/2016 - Present    Chronic diarrhea K52.9   2/10/2016 - Present    Elevated C-reactive protein (CRP) R79.82   3/17/2016 - Present    Chronic fatigue and malaise R53.82, R53.81   3/20/2016 - Present    Hypertriglyceridemia E78.1   3/20/2016 - Present    New onset seizure (CMS-HCC) R56.9   2016 - Present    Headache R51   7/10/2016 - Present    Elevated CSF protein R83.8   7/10/2016 - Present    Seizure (CMS-HCC) (Chronic) R56.9   2017 - Present    Memory loss (Chronic) R41.3   2017 - Present      Health Maintenance        Date Due Completion Dates    IMM DTaP/Tdap/Td Vaccine (1 - Tdap) 1993 ---    PAP SMEAR 1995 ---    MAMMOGRAM " 8/20/2014 ---            Current Immunizations     Influenza Vaccine Quad Inj (Pf) 10/1/2016      Below and/or attached are the medications your provider expects you to take. Review all of your home medications and newly ordered medications with your provider and/or pharmacist. Follow medication instructions as directed by your provider and/or pharmacist. Please keep your medication list with you and share with your provider. Update the information when medications are discontinued, doses are changed, or new medications (including over-the-counter products) are added; and carry medication information at all times in the event of emergency situations     Allergies:  SULFA DRUGS - Swelling     TAPE - (reactions not documented)               Medications  Valid as of: April 18, 2017 -  4:30 PM    Generic Name Brand Name Tablet Size Instructions for use    ALPRAZolam (Tab) XANAX 1 MG Take 1 Tab by mouth every bedtime. Take 1 tab po qhs for 4 weeks, then lower to 0.5 tab po qhs thereafter.        Baclofen (Tab) LIORESAL 10 MG Take 10 mg by mouth 3 times a day.        Cyclobenzaprine HCl (Tab) FLEXERIL 10 MG Take 10 mg by mouth 3 times a day as needed.        Estradiol (Tab) ESTRACE 1 MG Take 1.5 mg by mouth every day.        Gabapentin (Tab) NEURONTIN 600 MG Take 600 mg by mouth 3 times a day.        LevETIRAcetam (Tab) KEPPRA 250 MG Take 1 Tab by mouth 2 times a day. Take 1 tab po bid for one week, then lower to 1 tab po qhs for one week, then discontinue.        Lisinopril (Tab) PRINIVIL 10 MG Take 10 mg by mouth every day.        Ondansetron HCl (Tab) ZOFRAN 4 MG TAKE 1 TAB BY MOUTH EVERY 8 HOURS FOR 3 DAYS AS NEEDED FOR NAUSEA        Oxycodone-Acetaminophen (Tab) PERCOCET 7.5-325 MG Take 1 Tab by mouth every 8 hours as needed for Severe Pain.        PARoxetine HCl (Tab) PAXIL 20 MG Take 20 mg by mouth every day.        Potassium Chloride (Tab CR) KLOR-CON 10 MEQ Take 10 mEq by mouth 2 times a day.        Potassium  Chloride Sofia CR (Tab CR) K-DUR 10 MEQ Take 20 mEq by mouth.        .                 Medicines prescribed today were sent to:     [facility]/PHARMACY #[id number] - [place], [place] - [address]    [address] [place] [address]    Phone: [phone] Fax: [phone]    Open 24 Hours?: No      Medication refill instructions:       If your prescription bottle indicates you have medication refills left, it is not necessary to call your provider’s office. Please contact your pharmacy and they will refill your medication.    If your prescription bottle indicates you do not have any refills left, you may request refills at any time through one of the following ways: The online [employer] system (except Urgent Care), by calling your provider’s office, or by asking your pharmacy to contact your provider’s office with a refill request. Medication refills are processed only during regular business hours and may not be available until the next business day. Your provider may request additional information or to have a follow-up visit with you prior to refilling your medication.   *Please Note: Medication refills are assigned a new Rx number when refilled electronically. Your pharmacy may indicate that no refills were authorized even though a new prescription for the same medication is available at the pharmacy. Please request the medicine by name with the pharmacy before contacting your provider for a refill.           [employer] Access Code: Activation code not generated  Current [employer] Status: Active

## 2017-05-08 ENCOUNTER — HOSPITAL ENCOUNTER (OUTPATIENT)
Dept: LAB | Facility: MEDICAL CENTER | Age: 42
End: 2017-05-08
Attending: PSYCHIATRY & NEUROLOGY
Payer: COMMERCIAL

## 2017-05-08 DIAGNOSIS — R41.3 MEMORY LOSS: Chronic | ICD-10-CM

## 2017-05-08 LAB
FOLATE SERPL-MCNC: >23.3 NG/ML
TSH SERPL DL<=0.005 MIU/L-ACNC: 1.84 UIU/ML (ref 0.3–3.7)
VIT B12 SERPL-MCNC: 744 PG/ML (ref 211–911)

## 2017-05-08 PROCEDURE — 82607 VITAMIN B-12: CPT

## 2017-05-08 PROCEDURE — 82746 ASSAY OF FOLIC ACID SERUM: CPT

## 2017-05-08 PROCEDURE — 84207 ASSAY OF VITAMIN B-6: CPT

## 2017-05-08 PROCEDURE — 84443 ASSAY THYROID STIM HORMONE: CPT

## 2017-05-08 PROCEDURE — 36415 COLL VENOUS BLD VENIPUNCTURE: CPT

## 2017-05-11 LAB — VIT B6 SERPL-MCNC: 30.8 NMOL/L (ref 20–125)

## 2017-08-08 ENCOUNTER — OFFICE VISIT (OUTPATIENT)
Dept: NEUROLOGY | Facility: MEDICAL CENTER | Age: 42
End: 2017-08-08

## 2017-08-08 VITALS
BODY MASS INDEX: 35.99 KG/M2 | DIASTOLIC BLOOD PRESSURE: 80 MMHG | TEMPERATURE: 98.6 F | HEIGHT: 65 IN | OXYGEN SATURATION: 96 % | HEART RATE: 84 BPM | WEIGHT: 216 LBS | SYSTOLIC BLOOD PRESSURE: 112 MMHG | RESPIRATION RATE: 16 BRPM

## 2017-08-08 DIAGNOSIS — F41.0 PANIC DISORDER: ICD-10-CM

## 2017-08-08 DIAGNOSIS — R56.9 PROVOKED SEIZURES (HCC): ICD-10-CM

## 2017-08-08 DIAGNOSIS — R11.2 NAUSEA AND VOMITING, INTRACTABILITY OF VOMITING NOT SPECIFIED, UNSPECIFIED VOMITING TYPE: ICD-10-CM

## 2017-08-08 PROCEDURE — 99213 OFFICE O/P EST LOW 20 MIN: CPT | Performed by: PSYCHIATRY & NEUROLOGY

## 2017-08-08 RX ORDER — ONDANSETRON 4 MG/1
4 TABLET, FILM COATED ORAL EVERY 8 HOURS PRN
Qty: 60 TAB | Refills: 5 | Status: SHIPPED | OUTPATIENT
Start: 2017-08-08 | End: 2023-01-18

## 2017-08-08 RX ORDER — ALPRAZOLAM 0.25 MG/1
TABLET ORAL
Qty: 60 TAB | Refills: 0 | Status: SHIPPED | OUTPATIENT
Start: 2017-08-08 | End: 2019-01-28

## 2017-08-08 ASSESSMENT — PATIENT HEALTH QUESTIONNAIRE - PHQ9: CLINICAL INTERPRETATION OF PHQ2 SCORE: 0

## 2017-08-08 NOTE — MR AVS SNAPSHOT
"        Angelika Hamilton   2017 10:20 AM   Office Visit   MRN: [id number]    Department:  [facility]   Dept Phone:  [phone]    Description:  Female : 1975   Provider:  John Strong M.D.           Reason for Visit     Follow-Up Localization-related epilepsy (CMS-HCC)      Allergies as of 2017     Allergen Noted Reactions    Sulfa Drugs 2015   Swelling    Patient states tongue swells    Tape 2015       Paper tape is fine        You were diagnosed with     Panic disorder   [271137]       Nausea and vomiting, intractability of vomiting not specified, unspecified vomiting type   [2042858]         Vital Signs     Blood Pressure Pulse Temperature Respirations Height Weight    112/80 mmHg 84 37 °C (98.6 °F) 16 1.651 m (5' 5\") 97.977 kg (216 lb)    Body Mass Index Oxygen Saturation Smoking Status             35.94 kg/m2 96% Never Smoker          Basic Information     Date Of Birth Sex Race Ethnicity Preferred Language    1975 Female White Non- English      Problem List              ICD-10-CM Priority Class Noted - Resolved    Hormone replacement therapy (HRT) Z79.890   2/10/2016 - Present    Chronic back pain M54.9, G89.29   2/10/2016 - Present    Anxiety F41.9   2/10/2016 - Present    Hypertension I10   2/10/2016 - Present    Obesity E66.9   2/10/2016 - Present    Insomnia G47.00   2/10/2016 - Present    Chronic diarrhea K52.9   2/10/2016 - Present    Elevated C-reactive protein (CRP) R79.82   3/17/2016 - Present    Chronic fatigue and malaise R53.82, R53.81   3/20/2016 - Present    Hypertriglyceridemia E78.1   3/20/2016 - Present    New onset seizure (CMS-HCC) R56.9   2016 - Present    Headache R51   7/10/2016 - Present    Elevated CSF protein R83.8   7/10/2016 - Present    Seizure (CMS-HCC) (Chronic) R56.9   2017 - Present    Memory loss (Chronic) R41.3   2017 - Present      Health Maintenance        Date Due Completion Dates    IMM DTaP/Tdap/Td Vaccine " (1 - Tdap) 8/20/1993 ---    PAP SMEAR 8/20/1995 ---    MAMMOGRAM 8/20/2014 ---    IMM INFLUENZA (1) 9/1/2017 10/1/2016            Current Immunizations     Influenza Vaccine Quad Inj (Pf) 10/1/2016      Below and/or attached are the medications your provider expects you to take. Review all of your home medications and newly ordered medications with your provider and/or pharmacist. Follow medication instructions as directed by your provider and/or pharmacist. Please keep your medication list with you and share with your provider. Update the information when medications are discontinued, doses are changed, or new medications (including over-the-counter products) are added; and carry medication information at all times in the event of emergency situations     Allergies:  SULFA DRUGS - Swelling     TAPE - (reactions not documented)               Medications  Valid as of: August 08, 2017 - 11:14 AM    Generic Name Brand Name Tablet Size Instructions for use    ALPRAZolam (Tab) XANAX 0.25 MG Take one tab po qhs for 4 weeks, then lower to half a tab po qhs for 4 more weeks, then discontinue.        Cyclobenzaprine HCl (Tab) FLEXERIL 10 MG Take 10 mg by mouth 3 times a day as needed.        Estradiol (Tab) ESTRACE 1 MG Take 1.5 mg by mouth every day.        Gabapentin (Tab) NEURONTIN 600 MG Take 600 mg by mouth 3 times a day.        Lisinopril (Tab) PRINIVIL 10 MG Take 10 mg by mouth every day.        Ondansetron HCl (Tab) ZOFRAN 4 MG Take 1 Tab by mouth every 8 hours as needed for Nausea/Vomiting.        Oxycodone-Acetaminophen (Tab) PERCOCET 7.5-325 MG Take 1 Tab by mouth every 8 hours as needed for Severe Pain.        PARoxetine HCl (Tab) PAXIL 20 MG Take 20 mg by mouth every day.        .                 Medicines prescribed today were sent to:     [facility][id number] - [place] - [address] [address]    Phone: [phone] Fax: [phone]    Open 24 Hours?: No      Medication  refill instructions:       If your prescription bottle indicates you have medication refills left, it is not necessary to call your provider’s office. Please contact your pharmacy and they will refill your medication.    If your prescription bottle indicates you do not have any refills left, you may request refills at any time through one of the following ways: The online Treatsie system (except Urgent Care), by calling your provider’s office, or by asking your pharmacy to contact your provider’s office with a refill request. Medication refills are processed only during regular business hours and may not be available until the next business day. Your provider may request additional information or to have a follow-up visit with you prior to refilling your medication.   *Please Note: Medication refills are assigned a new Rx number when refilled electronically. Your pharmacy may indicate that no refills were authorized even though a new prescription for the same medication is available at the pharmacy. Please request the medicine by name with the pharmacy before contacting your provider for a refill.           Treatsie Access Code: Activation code not generated  Current Treatsie Status: Active

## 2017-08-08 NOTE — PROGRESS NOTES
Chief Complaint   Patient presents with   • Follow-Up     Localization-related epilepsy (CMS-HCC)       Problem List Items Addressed This Visit     None      Visit Diagnoses     Panic disorder         Relevant Medications     alprazolam (XANAX) 0.25 MG Tab     Nausea and vomiting, intractability of vomiting not specified, unspecified vomiting type         Relevant Medications     ondansetron (ZOFRAN) 4 MG Tab tablet     Provoked seizures (CMS-[facility])               Interim history:  Angelika Hamilton returns in follow up with her . She was discharged from EMU in April 2017. She tapered keppra as instructed, no longer taking. Thinks memory has improved significantly.  agrees. She lowered xanax to 0.5 mg po qhs and remains free of any spells. She would like to continue to lower it and to wean off of it.     She still has some anxiety and is using paxil. She is establishing care with a new PCP.     Her mood is otherwise great.     She had blood work done.     Her n/v spells are far apart now.     Past medical history:   History reviewed. No pertinent past medical history.    Past surgical history:   Past Surgical History   Procedure Laterality Date   • Hysterectomy, total abdominal         Family history:   Family History   Problem Relation Age of Onset   • Heart Disease Paternal Grandmother    • Stroke Paternal Grandmother    • Arthritis Paternal Grandmother    • Arthritis Mother    • Arthritis Father    • Arthritis Maternal Grandmother    • Arthritis Maternal Grandfather    • Arthritis Paternal Grandfather    • Cancer Paternal Grandfather        Social history:   Social History     Social History   • Marital Status:      Spouse Name: N/A   • Number of Children: N/A   • Years of Education: N/A     Occupational History   • Not on file.     Social History Main Topics   • Smoking status: Never Smoker    • Smokeless tobacco: Never Used   • Alcohol Use: 0.6 oz/week     1 Shots of liquor per week   •  Drug Use: No   • Sexual Activity: Not on file     Other Topics Concern   • Not on file     Social History Narrative       Current medications:   Current Outpatient Prescriptions   Medication   • alprazolam (XANAX) 0.25 MG Tab   • ondansetron (ZOFRAN) 4 MG Tab tablet   • cyclobenzaprine (FLEXERIL) 10 MG Tab   • paroxetine (PAXIL) 20 MG Tab   • lisinopril (PRINIVIL) 10 MG Tab   • oxycodone-acetaminophen (PERCOCET) 7.5-325 MG per tablet   • gabapentin (NEURONTIN) 600 MG tablet   • estradiol (ESTRACE) 1 MG Tab     No current facility-administered medications for this visit.       Medication Allergy:  Allergies   Allergen Reactions   • Sulfa Drugs Swelling     Patient states tongue swells   • Tape      Paper tape is fine         Review of systems:   Constitutional: denies fever, night sweats, weight loss, or malaise/fatigue.    Eyes: denies acute vision change, eye pain or secretion.    Ears, Nose, Mouth, Throat: denies nasal secretion, nasal bleeding, difficulty swallowing, hearing loss, tinnitus, vertigo, ear pain, oral ulcers or lesions.    Endocrine: denies recent weight changes, heat or cold intolerance, polyuria, polydypsia, polyphagia,abnormal hair growth.  Cardiovascular: denies new onset of chest pain, palpitations, syncope, lower extremity edema, or dyspnea of exertion.  Pulmonary: denies shortness of breath, new onset of cough, hemoptysis, wheezing, chest pain or flu-like symptoms.    GI: denies nausea, vomiting, diarrhea, GI bleeding, change in appetite, abdominal pain, and change in bowel habits.  : denies urinary incontinence, retention or hematuria.  Heme/oncology: denies history of easy bruising or bleeding. No history of cancer.    Allergy/immunology: denies hives/urticarial.  Dermatologic: denies new rash.  Musculoskeletal:denies joint swelling or pain, muscle pain, neck pain.    Neurologic: denies headaches, acute visual changes, facial droopiness, muscle weakness (focal or generalized),  "paresthesias, anesthesia, ataxia, change in speech or language, memory loss.  Psychiatric: denies symptoms of worsening of depression, hallucinations, suicidal or homicidal thoughts.        Physical examination:   Filed Vitals:    08/08/17 1044   BP: 112/80   Pulse: 84   Temp: 37 °C (98.6 °F)   Resp: 16   Height: 1.651 m (5' 5\")   Weight: 97.977 kg (216 lb)   SpO2: 96%     General: Patient in no acute distress, pleasant and cooperative. Overweight.    HEENT: Normocephalic, no signs of acute trauma.    Neck: supple, no meningeal signs or carotid bruits. There is normal range of motion. No tenderness on exam.    Chest: clear to auscultation. No cough.    CV: RRR, no murmurs.    Skin: no signs of acute rashes or trauma.    Musculoskeletal: joints exhibit full range of motion, without any pain to palpation. There are no signs of joint or muscle swelling. There is no tenderness to deep palpation of muscles.    Psychiatric: No hallucinatory behavior. Denies symptoms of depression or suicidal ideation. Mood and affect appear normal on exam.     NEUROLOGICAL EXAM:    Mental status, orientation: Awake, alert and fully oriented.    Speech and language: speech is clear and fluent. The patient is able to name, repeat and comprehend.    Memory: There is intact recollection of recent and remote events.    Cranial nerve exam: Pupils are 3-4 mm bilaterally and equally reactive to light and accommodation. Visual fields are intact by confrontation. There is no nystagmus on primary or secondary gaze. Intact full EOM in all directions of gaze. Face appears symmetric. Sensation in the face is intact to light touch. Uvula is midline. Palate elevates symmetrically. Tongue is midline and without any signs of tongue biting or fasciculations. Sternocleidomastoid muscles exhibit is normal strength bilaterally. Shoulder shrug is intact bilaterally.    Motor exam: Strength is 5/5 in all extremities. Tone is normal. No abnormal movements were " seen on exam.    Sensory exam reveals normal sense of light touch in all extremities.    Deep tendon reflexes:  2+ throughout. Plantar responses are flexor. There is no clonus.    Coordination: shows a normal finger-nose-finger. Normal rapidly alternating movements.    Gait: The patient was able to get up from seated position on first attempt without requiring assistance. Found to be steady when walking. Movements were fluid with normal arm swing. The patient was able to turn without difficulties or tendency to fall. Romberg exam shows mild swaying.      ANCILLARY DATA REVIEWED:     Lab Data Review:  TSH, folate, B6 and B12 were normal.       Imaging:    MRI brain w/wo 7/10/16:  1. MRI OF THE BRAIN WITHOUT AND WITH CONTRAST WITHIN NORMAL LIMITS.  2. NO EVIDENCE OF MASS LESION, HETEROTOPIC GRAY MATTER, GROSS CORTICAL DYSPLASIA, OR MESIAL TEMPORAL SCLEROSIS.      EEG, routine, 7/11/16:  This is a normal routine EEG recording in the awake only state. Clinical correlation is recommended.  Note: A normal EEG does not rule out epilepsy. If the clinical suspicion remains high for seizures, a prolonged recording to capture clinical or subclinical events may be helpful.      VEEG, 4/3/17 - 4/7/17  This is a normal 5 days video electroencephalogram recording in the awake, drowsy and sleep state.  No events were captured during the study. Clinical correlation is recommended.          ASSESSMENT AND PLAN:    1. Panic disorder  - alprazolam (XANAX) 0.25 MG Tab; Take one tab po qhs for 4 weeks, then lower to half a tab po qhs for 4 more weeks, then discontinue.  Dispense: 60 Tab; Refill: 0    2. Nausea and vomiting, intractability of vomiting not specified, unspecified vomiting type  - ondansetron (ZOFRAN) 4 MG Tab tablet; Take 1 Tab by mouth every 8 hours as needed for Nausea/Vomiting.  Dispense: 60 Tab; Refill: 5    3. Provoked seizures (CMS-[facility])        CLINICAL DISCUSSION:   Febrile seizures as child.  Seizures only after she  has been vomiting for a few days and unable to keep meds down (xanax).  Suspect she withdraws from xanax and has seizures as a result. Her VEEG was normal.    We tapered keppra since admission but continued xanax at lower dose of 0.5 mg po qhs. No spells. Discussed possibly weaning of xanax, she agrees. Will lower to 0.25 mg po qhs for 4 weeks, then 0.125 mg po qhs for 4 weeks, then discontinue.   Memory much improved. We suspect anxiety and probably side effect from Keppra as culprits.   I have no indication she has epilepsy, but they are aware only time will tell.   She has no driving restrictions at this time.   On gabapentin due to chronic back pain.        FOLLOW-UP:   Return if symptoms worsen or fail to improve.        EDUCATION AND COUNSELING:  -Education was provided to the patient and/or family regarding diagnosis and prognosis. The chronic and unpredictable nature of the condition were discussed. There may increased risk for additional events?, which may carry potential for significant injuries and death.    -Counseling was also provided on potential effects of alcohol and other drugs, which may lower seizure threshold and/or affect the metabolism of antiepileptic drugs. We recommend avoidance of alcohol and illegal drugs. Denies use.    -We extensively discussed the aspects related to safety in drivers who suffer from epilepsy or seizures or other spells. The patient is encourage to report to the Division of Motor Vehicles of any condition and/or spells related to confusion, disorientation, and/or loss of awareness and/or loss of consciousness; as these may pose a safety issue if they occur while operating a motor vehicle. The patient and/or family are ultimately responsible for exercising caution and abiding to regulations in place. Continue to refrain from driving from now.    -Other precautions were discussed at length, including no diving, no skydiving, no climbing or exposure to unprotected heights,  no unsupervised swimming, no Jacuzzi or bathing in bathtubs or deep bodies of water.     Patient and  agree with plan, as outlined.        John Strong MD  Medical Director, Epilepsy and Neurodiagnostics.   Clinical  of Neurology [facility] of Medicine.   Diplomate in Neurology, Epilepsy, and Electrodiagnostic Medicine.   Office: [phone]  Fax: [phone]

## 2017-11-14 ENCOUNTER — APPOINTMENT (RX ONLY)
Dept: URBAN - NONMETROPOLITAN AREA CLINIC 15 | Facility: CLINIC | Age: 42
Setting detail: DERMATOLOGY
End: 2017-11-14

## 2017-11-14 DIAGNOSIS — D18.0 HEMANGIOMA: ICD-10-CM

## 2017-11-14 DIAGNOSIS — L72.8 OTHER FOLLICULAR CYSTS OF THE SKIN AND SUBCUTANEOUS TISSUE: ICD-10-CM

## 2017-11-14 DIAGNOSIS — L81.4 OTHER MELANIN HYPERPIGMENTATION: ICD-10-CM

## 2017-11-14 DIAGNOSIS — D22 MELANOCYTIC NEVI: ICD-10-CM

## 2017-11-14 DIAGNOSIS — L82.1 OTHER SEBORRHEIC KERATOSIS: ICD-10-CM

## 2017-11-14 PROBLEM — D22.61 MELANOCYTIC NEVI OF RIGHT UPPER LIMB, INCLUDING SHOULDER: Status: ACTIVE | Noted: 2017-11-14

## 2017-11-14 PROBLEM — F41.9 ANXIETY DISORDER, UNSPECIFIED: Status: ACTIVE | Noted: 2017-11-14

## 2017-11-14 PROBLEM — D22.5 MELANOCYTIC NEVI OF TRUNK: Status: ACTIVE | Noted: 2017-11-14

## 2017-11-14 PROBLEM — D22.72 MELANOCYTIC NEVI OF LEFT LOWER LIMB, INCLUDING HIP: Status: ACTIVE | Noted: 2017-11-14

## 2017-11-14 PROBLEM — F32.9 MAJOR DEPRESSIVE DISORDER, SINGLE EPISODE, UNSPECIFIED: Status: ACTIVE | Noted: 2017-11-14

## 2017-11-14 PROBLEM — D48.5 NEOPLASM OF UNCERTAIN BEHAVIOR OF SKIN: Status: ACTIVE | Noted: 2017-11-14

## 2017-11-14 PROBLEM — D22.71 MELANOCYTIC NEVI OF RIGHT LOWER LIMB, INCLUDING HIP: Status: ACTIVE | Noted: 2017-11-14

## 2017-11-14 PROBLEM — I10 ESSENTIAL (PRIMARY) HYPERTENSION: Status: ACTIVE | Noted: 2017-11-14

## 2017-11-14 PROBLEM — D22.62 MELANOCYTIC NEVI OF LEFT UPPER LIMB, INCLUDING SHOULDER: Status: ACTIVE | Noted: 2017-11-14

## 2017-11-14 PROBLEM — D18.01 HEMANGIOMA OF SKIN AND SUBCUTANEOUS TISSUE: Status: ACTIVE | Noted: 2017-11-14

## 2017-11-14 PROCEDURE — ? BIOPSY BY PUNCH METHOD

## 2017-11-14 PROCEDURE — ? COUNSELING

## 2017-11-14 PROCEDURE — 11100: CPT

## 2017-11-14 PROCEDURE — 99203 OFFICE O/P NEW LOW 30 MIN: CPT | Mod: 25

## 2017-11-14 ASSESSMENT — LOCATION SIMPLE DESCRIPTION DERM
LOCATION SIMPLE: LEFT LOWER BACK
LOCATION SIMPLE: UPPER BACK
LOCATION SIMPLE: ABDOMEN
LOCATION SIMPLE: RIGHT UPPER ARM
LOCATION SIMPLE: CHEST
LOCATION SIMPLE: RIGHT CHEEK
LOCATION SIMPLE: LEFT PRETIBIAL REGION
LOCATION SIMPLE: LEFT POPLITEAL SKIN
LOCATION SIMPLE: RIGHT POPLITEAL SKIN
LOCATION SIMPLE: LEFT UPPER BACK
LOCATION SIMPLE: RIGHT POSTERIOR THIGH
LOCATION SIMPLE: RIGHT PRETIBIAL REGION
LOCATION SIMPLE: LEFT POSTERIOR THIGH
LOCATION SIMPLE: LEFT UPPER ARM

## 2017-11-14 ASSESSMENT — LOCATION DETAILED DESCRIPTION DERM
LOCATION DETAILED: LEFT LATERAL PROXIMAL PRETIBIAL REGION
LOCATION DETAILED: LEFT ANTERIOR DISTAL UPPER ARM
LOCATION DETAILED: RIGHT ANTERIOR DISTAL UPPER ARM
LOCATION DETAILED: XIPHOID
LOCATION DETAILED: INFERIOR THORACIC SPINE
LOCATION DETAILED: RIGHT MEDIAL SUPERIOR CHEST
LOCATION DETAILED: LEFT INFERIOR LATERAL MIDBACK
LOCATION DETAILED: RIGHT DISTAL POSTERIOR THIGH
LOCATION DETAILED: LEFT PROXIMAL PRETIBIAL REGION
LOCATION DETAILED: LEFT DISTAL POSTERIOR THIGH
LOCATION DETAILED: SUBXIPHOID
LOCATION DETAILED: RIGHT ANTECUBITAL SKIN
LOCATION DETAILED: SUPERIOR THORACIC SPINE
LOCATION DETAILED: RIGHT INFERIOR CENTRAL MALAR CHEEK
LOCATION DETAILED: RIGHT PROXIMAL PRETIBIAL REGION
LOCATION DETAILED: LEFT POPLITEAL SKIN
LOCATION DETAILED: LEFT INFERIOR MEDIAL UPPER BACK
LOCATION DETAILED: RIGHT POPLITEAL SKIN

## 2017-11-14 ASSESSMENT — LOCATION ZONE DERM
LOCATION ZONE: ARM
LOCATION ZONE: LEG
LOCATION ZONE: TRUNK
LOCATION ZONE: FACE

## 2017-11-14 NOTE — PROCEDURE: BIOPSY BY PUNCH METHOD
Size Of Lesion In Cm (Optional): 0.6
Consent: Written consent was obtained and risks were reviewed including but not limited to scarring, infection, bleeding, scabbing, incomplete removal, nerve damage and allergy to anesthesia.
Patient Will Remove Sutures At Home?: No
Home Suture Removal Text: Patient was provided a home suture removal kit and will remove their sutures at home.  If they have any questions or difficulties they will call the office.
Suture Removal: 14 days
Notification Instructions: Patient will be notified of biopsy results. However, patient instructed to call the office if not contacted within 2 weeks.
Hemostasis: None
Biopsy Type: H and E
Wound Care: Vaseline
Detail Level: Detailed
Additional Anesthesia Volume In Cc (Will Not Render If 0): 0
Anesthesia Type: 1% lidocaine with epinephrine
Epidermal Sutures: 4-0 PGA
Lab: 253
Number Of Epidermal Sutures (Optional): 3
Post-Care Instructions: I reviewed with the patient in detail post-care instructions. Patient is to keep the biopsy site dry overnight, and then apply bacitracin twice daily until healed. Patient may apply hydrogen peroxide soaks to remove any crusting.
Punch Size In Mm: 6
Anesthesia Volume In Cc: 0.5
Lab Facility: 
Billing Type: Third-Party Bill
Dressing: bandage

## 2018-01-03 ENCOUNTER — OFFICE VISIT (OUTPATIENT)
Dept: NEUROLOGY | Facility: MEDICAL CENTER | Age: 43
End: 2018-01-03
Payer: COMMERCIAL

## 2018-01-03 DIAGNOSIS — R56.9 SEIZURE (HCC): Chronic | ICD-10-CM

## 2018-01-03 PROCEDURE — 99212 OFFICE O/P EST SF 10 MIN: CPT | Performed by: PHYSICIAN ASSISTANT

## 2018-01-04 NOTE — PROGRESS NOTES
Cc:  - seizure    Established patient of Ligia  who suffers from seizures  Here today for completion of paperwork for FMLA/disability/related to their condition.    They report to me that their last event was > 90 days ago.    Review of the medical chart and interviewing patient, I completed paperwork and it is scanned into media.     RTC as previously scheduled.  Pt was advised that she must be seen by Dr. Strong within 6 months of completion of DMV forms and she verbalized her understanding.  If she does not wish to do this then she can pursue completion of form through her PCP.    I have made the patient aware of mandatory reporting required by the law in the State [facility] regarding episodes of seizures, loss of consciousness, and/or alteration of awareness. The patient and family are responsible for reporting events to the DMV, instructions were provided. The patient verbalized understanding.     Total time with this visit: 10   Minutes face-to-face with patient. More than 50% of this visit was spent reviewing medical chart and speaking with the patient about their condition and how it affects their ability to do their job.

## 2018-10-18 ENCOUNTER — OFFICE VISIT (OUTPATIENT)
Dept: URGENT CARE | Facility: PHYSICIAN GROUP | Age: 43
End: 2018-10-18
Payer: COMMERCIAL

## 2018-10-18 VITALS
DIASTOLIC BLOOD PRESSURE: 84 MMHG | TEMPERATURE: 97.2 F | OXYGEN SATURATION: 97 % | RESPIRATION RATE: 16 BRPM | SYSTOLIC BLOOD PRESSURE: 122 MMHG | HEIGHT: 65 IN | WEIGHT: 182 LBS | BODY MASS INDEX: 30.32 KG/M2 | HEART RATE: 76 BPM

## 2018-10-18 DIAGNOSIS — Z23 NEED FOR TETANUS BOOSTER: ICD-10-CM

## 2018-10-18 DIAGNOSIS — S61.216A LACERATION OF RIGHT LITTLE FINGER WITHOUT FOREIGN BODY WITHOUT DAMAGE TO NAIL, INITIAL ENCOUNTER: Primary | ICD-10-CM

## 2018-10-18 PROCEDURE — 90471 IMMUNIZATION ADMIN: CPT | Performed by: PHYSICIAN ASSISTANT

## 2018-10-18 PROCEDURE — 12001 RPR S/N/AX/GEN/TRNK 2.5CM/<: CPT | Performed by: PHYSICIAN ASSISTANT

## 2018-10-18 PROCEDURE — 90715 TDAP VACCINE 7 YRS/> IM: CPT | Performed by: PHYSICIAN ASSISTANT

## 2018-10-18 NOTE — PROGRESS NOTES
Chief Complaint   Patient presents with   • Laceration     This is a 43 y.o. female who presents for repair of skin laceration(s).    HPI: Yesterday, little less than 24 hours ago she cut her right pinky finger with a knife.  She applied a bandage and pressure but is concerned because it keeps bleeding.    No past medical history on file.    Past Surgical History:   Procedure Laterality Date   • HYSTERECTOMY, TOTAL ABDOMINAL         Social History     Social History   • Marital status:      Spouse name: N/A   • Number of children: N/A   • Years of education: N/A     Occupational History   • Not on file.     Social History Main Topics   • Smoking status: Never Smoker   • Smokeless tobacco: Never Used   • Alcohol use 0.6 oz/week     1 Shots of liquor per week   • Drug use: No   • Sexual activity: Not on file     Other Topics Concern   • Not on file     Social History Narrative   • No narrative on file       Family History   Problem Relation Age of Onset   • Heart Disease Paternal Grandmother    • Stroke Paternal Grandmother    • Arthritis Paternal Grandmother    • Arthritis Mother    • Arthritis Father    • Arthritis Maternal Grandmother    • Arthritis Maternal Grandfather    • Arthritis Paternal Grandfather    • Cancer Paternal Grandfather        Current Outpatient Prescriptions   Medication Sig Dispense Refill   • oxycodone-acetaminophen (PERCOCET) 7.5-325 MG per tablet Take 1 Tab by mouth every 8 hours as needed for Severe Pain. 1 Tab 0   • gabapentin (NEURONTIN) 600 MG tablet Take 600 mg by mouth 3 times a day.     • estradiol (ESTRACE) 1 MG Tab Take 1.5 mg by mouth every day.     • alprazolam (XANAX) 0.25 MG Tab Take one tab po qhs for 4 weeks, then lower to half a tab po qhs for 4 more weeks, then discontinue. 60 Tab 0   • ondansetron (ZOFRAN) 4 MG Tab tablet Take 1 Tab by mouth every 8 hours as needed for Nausea/Vomiting. 60 Tab 5   • cyclobenzaprine (FLEXERIL) 10 MG Tab Take 10 mg by mouth 3 times a day  as needed.     • paroxetine (PAXIL) 20 MG Tab Take 20 mg by mouth every day.     • lisinopril (PRINIVIL) 10 MG Tab Take 10 mg by mouth every day.       No current facility-administered medications for this visit.        Sulfa drugs and Tape    ROS:    General: Denies fever, chills, acute unexpected weight changes  Skin:  As in HPI  Cardiac:  No recent CP, palpitations, or dyspnea on exertion  Respiratory:  No acute shortness of breath, hemoptysis    PHYSICAL EXAM:  Vital signs reviewed  General:  Well developed, well nourished, NAD  Skin: 1 cm curved laceration to the ulnar aspect of her left little finger, just distal to the DIP joint.  Good distal circulation and sensation.    1. Laceration of right little finger without foreign body without damage to nail, initial encounter     2. Need for tetanus booster  Tdap =>5yo IM       PROCEDURE:    Permit: Procedure, benefits, risks (including those of bleeding, infection, injury, anesthesia, and allergic reaction), and alternatives explained to the patient who voiced understanding of the information. Their questions were sought and answered. Patient agreed to proceed with the laceration repair.    Indication: Laceration    Provider: Erik Martin PA-C    Anesthesia: None necessary    Description:The area was thoroughly irrigated and cleaned and inspected for foreign bodies. Finding no foreign bodies, laceration repair commenced. Wound edges were easily approximated.  Dermabond applied    Hemostasis / closure: Dermabond    Complications: none    Estimated blood loss:  0 cc.     Disposition: Patient alert, and oriented.  Breathing nonlabored.  Procedure site has been cleaned, dressed, and wound care instructions have been given. Patient is to followup if there is any significant erythema, tenderness, or drainage from the procedure site.

## 2018-11-15 ENCOUNTER — GYNECOLOGY VISIT (OUTPATIENT)
Dept: OBGYN | Facility: CLINIC | Age: 43
End: 2018-11-15
Payer: COMMERCIAL

## 2018-11-15 ENCOUNTER — HOSPITAL ENCOUNTER (OUTPATIENT)
Facility: MEDICAL CENTER | Age: 43
End: 2018-11-15
Attending: OBSTETRICS & GYNECOLOGY
Payer: COMMERCIAL

## 2018-11-15 VITALS
DIASTOLIC BLOOD PRESSURE: 76 MMHG | BODY MASS INDEX: 29.09 KG/M2 | SYSTOLIC BLOOD PRESSURE: 120 MMHG | HEIGHT: 66 IN | WEIGHT: 181 LBS

## 2018-11-15 DIAGNOSIS — Q51.28 DIDELPHIC UTERUS: ICD-10-CM

## 2018-11-15 DIAGNOSIS — Z12.31 ENCOUNTER FOR SCREENING MAMMOGRAM FOR BREAST CANCER: ICD-10-CM

## 2018-11-15 DIAGNOSIS — Z12.4 SCREENING FOR CERVICAL CANCER: ICD-10-CM

## 2018-11-15 DIAGNOSIS — Z11.51 SPECIAL SCREENING EXAMINATION FOR HUMAN PAPILLOMAVIRUS (HPV): ICD-10-CM

## 2018-11-15 DIAGNOSIS — Z01.419 WELL WOMAN EXAM: ICD-10-CM

## 2018-11-15 DIAGNOSIS — Q51.820 DOUBLE CERVIX: ICD-10-CM

## 2018-11-15 PROCEDURE — 88175 CYTOPATH C/V AUTO FLUID REDO: CPT

## 2018-11-15 PROCEDURE — 87624 HPV HI-RISK TYP POOLED RSLT: CPT | Mod: 91

## 2018-11-15 PROCEDURE — 99396 PREV VISIT EST AGE 40-64: CPT | Performed by: OBSTETRICS & GYNECOLOGY

## 2018-11-15 RX ORDER — ESTRADIOL 2 MG/1
2 TABLET ORAL DAILY
Qty: 30 TAB | Refills: 11 | Status: SHIPPED | OUTPATIENT
Start: 2018-11-15 | End: 2023-01-18

## 2018-11-15 NOTE — PROGRESS NOTES
Angelika Hamilton is a 43 y.o.  female who presents for her Annual Gynecologic Exam      HPI Comments: Pt presents for her annual well woman exam. Prior patient of mine from [facility]  Pt has no complaints. On 2mg Estradiol daily for HRT (complete hysterectomy)  Has lost 40 pounds since her last appt with me.  No LMP recorded (exact date).    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 No abnl pap. Pt has 2 cervix. Prior LSO due to teratoma.    All PMH, PSH, allergies, social history and FH reviewed and updated today:  Past Medical History:   Diagnosis Date   • Arthritis    • Hypertension    • Migraine    • Urinary tract infection      Past Surgical History:   Procedure Laterality Date   • ABDOMINAL HYSTERECTOMY TOTAL     • HYSTERECTOMY, TOTAL ABDOMINAL     • OTHER      gall bladder     Medications:   Current Outpatient Prescriptions Ordered in [facility]   Medication Sig Dispense Refill   • alprazolam (XANAX) 0.25 MG Tab Take one tab po qhs for 4 weeks, then lower to half a tab po qhs for 4 more weeks, then discontinue. 60 Tab 0   • ondansetron (ZOFRAN) 4 MG Tab tablet Take 1 Tab by mouth every 8 hours as needed for Nausea/Vomiting. 60 Tab 5   • cyclobenzaprine (FLEXERIL) 10 MG Tab Take 10 mg by mouth 3 times a day as needed.     • paroxetine (PAXIL) 20 MG Tab Take 20 mg by mouth every day.     • lisinopril (PRINIVIL) 10 MG Tab Take 10 mg by mouth every day.     • oxycodone-acetaminophen (PERCOCET) 7.5-325 MG per tablet Take 1 Tab by mouth every 8 hours as needed for Severe Pain. 1 Tab 0   • gabapentin (NEURONTIN) 600 MG tablet Take 600 mg by mouth 3 times a day.     • estradiol (ESTRACE) 1 MG Tab Take 1.5 mg by mouth every day.       No current [facility]-ordered facility-administered medications on file.      Sulfa drugs and  "Tape  Social History     Social History   • Marital status:      Spouse name: N/A   • Number of children: N/A   • Years of education: N/A     Social History Main Topics   • Smoking status: Never Smoker   • Smokeless tobacco: Never Used   • Alcohol use 0.6 oz/week     1 Shots of liquor per week   • Drug use: No   • Sexual activity: Yes     Partners: Male     Other Topics Concern   • Not on file     Social History Narrative   • No narrative on file     Family History   Problem Relation Age of Onset   • Heart Disease Paternal Grandmother    • Stroke Paternal Grandmother    • Arthritis Paternal Grandmother    • Arthritis Mother    • Arthritis Father    • Arthritis Maternal Grandmother    • Arthritis Maternal Grandfather    • Arthritis Paternal Grandfather    • Cancer Paternal Grandfather           Objective:   Vital measurements:  Blood pressure 120/76, height 1.676 m (5' 6\"), weight 82.1 kg (181 lb), not currently breastfeeding.  Body mass index is 29.21 kg/m². (Goal BM I>18 <25)    Physical Exam   Nursing note and vitals reviewed.  Constitutional: She is oriented to person, place, and time. She appears well-developed and well-nourished. No distress.     HEENT:   Head: Normocephalic and atraumatic.   Right Ear: External ear normal.   Left Ear: External ear normal.   Nose: Nose normal.   Eyes: Conjunctivae and EOM are normal. Pupils are equal, round, and reactive to light. No scleral icterus.     Neck: Normal range of motion. Neck supple. No tracheal deviation present. No thyromegaly present. No cervical or supraclavicular lymphadenopathy.    Pulmonary/Chest: Effort normal and breath sounds normal. No respiratory distress. She has no wheezes. She has no rales. She exhibits no tenderness.     Cardiovascular: Regular, rate and rhythm. No edema.    Breast:  Symmetrical, normal consistency without masses., No dimpling or skin changes, Normal nipples without discharge, no axillary lymphadenopathy    Abdominal: Soft. " Bowel sounds are normal. She exhibits no distension and no mass. No tenderness. She has no rebound and no guarding.     Genitourinary: Obesity limits exam accuracy  Pelvic exam was performed with patient supine.  External genitalia with no abnormal pigmentation, labial fusion, rash, tenderness, lesion or injury to the labia bilaterally.  BUS normal  Vagina is pink and moist with no lesions, foul discharge, erythema, tenderness or bleeding. No foreign body around the vagina or signs of injury.   Cervix x 2 are normal in appearance and exhibit no motion tenderness, no discharge and no friability, no lesions.   Uterus is surgically absent  Right adnexa and left adnexa surgically absent     Musculoskeletal: Normal range of motion. Non tender. She exhibits no edema and no tenderness.     Lymphadenopathy: She has no cervical or supraclavicular adenopathy.     Neurological: She is alert and oriented to person, place, and time. She exhibits normal muscle tone.     Skin: Skin is warm and dry. No rash noted. She is not diaphoretic. No erythema. No pallor.     Psychiatric: She has a normal mood and affect. Her behavior is normal. Judgment and thought content normal.        Assessment:     1. Well woman exam  MA-MAMMO SCREENING BILAT W/CARMELA W/O CAD    CBC WITH DIFFERENTIAL    COMP METABOLIC PANEL    TSH    VITAMIN D,25 HYDROXY    Lipid Profile    CANCELED: THINPREP PAP WITH HPV   2. Encounter for screening mammogram for breast cancer  MA-MAMMO SCREENING BILAT W/CARMELA W/O CAD   3. Screening for cervical cancer  CANCELED: THINPREP PAP WITH HPV   4. Special screening examination for human papillomavirus (HPV)  CANCELED: THINPREP PAP WITH HPV   5. Double cervix     6. Didelphic uterus           Plan:   Pap (on each cervix) and physical exam performed  Self breast awareness discussed.  Counseling: HRT  Encouraged exercise and proper diet.  Refilled Estradiol 2mg daily for HRT  Screening blood work ordered  Mammograms annually.  Patient given order for screening lucy mammogram.  See medications and orders placed in encounter report.  Weight bearing exercise daily to strengthen bones  Calcium 1200mg daily and 800 IU daily  RTC 1 year

## 2018-11-16 DIAGNOSIS — Z12.4 SCREENING FOR CERVICAL CANCER: ICD-10-CM

## 2018-11-16 DIAGNOSIS — Q51.820 DOUBLE CERVIX: ICD-10-CM

## 2018-11-16 DIAGNOSIS — Z01.419 WELL WOMAN EXAM: ICD-10-CM

## 2018-11-16 DIAGNOSIS — Q51.28 DIDELPHIC UTERUS: ICD-10-CM

## 2018-11-16 DIAGNOSIS — Z12.31 ENCOUNTER FOR SCREENING MAMMOGRAM FOR BREAST CANCER: ICD-10-CM

## 2018-11-16 LAB
CYTOLOGY REG CYTOL: NORMAL
CYTOLOGY REG CYTOL: NORMAL
HPV HR 12 DNA CVX QL NAA+PROBE: NEGATIVE
HPV HR 12 DNA CVX QL NAA+PROBE: NEGATIVE
HPV16 DNA SPEC QL NAA+PROBE: NEGATIVE
HPV16 DNA SPEC QL NAA+PROBE: NEGATIVE
HPV18 DNA SPEC QL NAA+PROBE: NEGATIVE
HPV18 DNA SPEC QL NAA+PROBE: NEGATIVE
SPECIMEN SOURCE: NORMAL
SPECIMEN SOURCE: NORMAL

## 2018-12-04 ENCOUNTER — TELEPHONE (OUTPATIENT)
Dept: OBGYN | Facility: CLINIC | Age: 43
End: 2018-12-04

## 2018-12-04 RX ORDER — NYSTATIN 100000 U/G
CREAM TOPICAL
Qty: 40 G | Refills: 1 | Status: SHIPPED | OUTPATIENT
Start: 2018-12-04

## 2018-12-04 NOTE — TELEPHONE ENCOUNTER
----- Message from Elis Schaefer M.D. sent at 12/4/2018 11:52 AM PST -----  Regarding: RE: Rx for Nystatin  Rx sent    ----- Message -----  From: Cleopatra Velazquez, Med Ass't  Sent: 12/4/2018  10:16 AM  To: Elis Schaefer M.D.  Subject: Rx for Nystatin                                  Pt called and would like to know if you can prescribe her nystatin for yeast under her breast.     Pt notified.

## 2018-12-18 ENCOUNTER — OFFICE VISIT (OUTPATIENT)
Dept: URGENT CARE | Facility: PHYSICIAN GROUP | Age: 43
End: 2018-12-18
Payer: COMMERCIAL

## 2018-12-18 VITALS
HEIGHT: 66 IN | WEIGHT: 182 LBS | SYSTOLIC BLOOD PRESSURE: 116 MMHG | OXYGEN SATURATION: 97 % | HEART RATE: 80 BPM | TEMPERATURE: 96.8 F | BODY MASS INDEX: 29.25 KG/M2 | RESPIRATION RATE: 16 BRPM | DIASTOLIC BLOOD PRESSURE: 78 MMHG

## 2018-12-18 DIAGNOSIS — J02.0 STREP PHARYNGITIS: ICD-10-CM

## 2018-12-18 PROCEDURE — 99214 OFFICE O/P EST MOD 30 MIN: CPT | Performed by: PHYSICIAN ASSISTANT

## 2018-12-18 RX ORDER — AZITHROMYCIN 250 MG/1
TABLET, FILM COATED ORAL
Qty: 6 TAB | Refills: 0 | Status: SHIPPED | OUTPATIENT
Start: 2018-12-18 | End: 2019-01-28

## 2018-12-18 RX ORDER — MELOXICAM 7.5 MG/1
7.5 TABLET ORAL DAILY
COMMUNITY
End: 2021-12-10

## 2018-12-18 NOTE — PROGRESS NOTES
Chief Complaint   Patient presents with   • Pharyngitis       HISTORY OF PRESENT ILLNESS: Patient is a 43 y.o. female who presents today because she started to get a sore throat and a fever last night.  It got worse this morning.  She has been taking some ibuprofen for her symptoms.  Her son was in yesterday, tested positive for strep.    Patient Active Problem List    Diagnosis Date Noted   • Seizure (HCC) 04/07/2017   • Memory loss 04/07/2017   • Headache 07/10/2016   • Elevated CSF protein 07/10/2016   • New onset seizure (HCC) 07/09/2016   • Chronic fatigue and malaise 03/20/2016   • Hypertriglyceridemia 03/20/2016   • Elevated C-reactive protein (CRP) 03/17/2016   • Hormone replacement therapy (HRT) 02/10/2016   • Chronic back pain 02/10/2016   • Anxiety 02/10/2016   • Hypertension 02/10/2016   • Obesity 02/10/2016   • Insomnia 02/10/2016   • Chronic diarrhea 02/10/2016       Allergies:Sulfa drugs and Tape    Current Outpatient Prescriptions Ordered in [facility]   Medication Sig Dispense Refill   • meloxicam (MOBIC) 7.5 MG Tab Take 7.5 mg by mouth every day.     • azithromycin (ZITHROMAX) 250 MG Tab Follow package directions 6 Tab 0   • estradiol (ESTRACE) 2 MG Tab Take 1 Tab by mouth every day. 30 Tab 11   • oxycodone-acetaminophen (PERCOCET) 7.5-325 MG per tablet Take 1 Tab by mouth every 8 hours as needed for Severe Pain. 1 Tab 0   • nystatin (MYCOSTATIN) 818364 UNIT/GM Cream topical cream Apply cream topically to affected areas BID as needed 40 g 1   • alprazolam (XANAX) 0.25 MG Tab Take one tab po qhs for 4 weeks, then lower to half a tab po qhs for 4 more weeks, then discontinue. 60 Tab 0   • ondansetron (ZOFRAN) 4 MG Tab tablet Take 1 Tab by mouth every 8 hours as needed for Nausea/Vomiting. 60 Tab 5   • cyclobenzaprine (FLEXERIL) 10 MG Tab Take 10 mg by mouth 3 times a day as needed.     • paroxetine (PAXIL) 20 MG Tab Take 20 mg by mouth every day.     • lisinopril (PRINIVIL) 10 MG Tab Take 10 mg by mouth  "every day.     • gabapentin (NEURONTIN) 600 MG tablet Take 600 mg by mouth 3 times a day.       No current [facility]-ordered facility-administered medications on file.        Past Medical History:   Diagnosis Date   • Arthritis    • Hypertension    • Migraine    • Urinary tract infection        Social History   Substance Use Topics   • Smoking status: Never Smoker   • Smokeless tobacco: Never Used   • Alcohol use 0.6 oz/week     1 Shots of liquor per week       Family Status   Relation Status   • PGMo    • Mo Alive   • Fa Alive   • Sis Alive   • Bro Alive   • MAunt Alive   • MUnc Alive   • PAunt Alive   • PUnc Alive   • MGMo    • MGFa    • PGFa      Family History   Problem Relation Age of Onset   • Heart Disease Paternal Grandmother    • Stroke Paternal Grandmother    • Arthritis Paternal Grandmother    • Arthritis Mother    • Arthritis Father    • Arthritis Maternal Grandmother    • Arthritis Maternal Grandfather    • Arthritis Paternal Grandfather    • Cancer Paternal Grandfather        ROS:  Review of Systems   Constitutional: Positive for fever, chills, no weight loss and malaise/fatigue.   HENT: Negative for ear pain, nosebleeds, congestion, positive for sore throat and neck pain.    Eyes: Negative for blurred vision.   Respiratory: Negative for cough, sputum production, shortness of breath and wheezing.    Cardiovascular: Negative for chest pain, palpitations, orthopnea and leg swelling.   Gastrointestinal: Negative for heartburn, nausea, vomiting and abdominal pain.   Genitourinary: Negative for dysuria, urgency and frequency.     Exam:  Blood pressure 116/78, pulse 80, temperature 36 °C (96.8 °F), temperature source Temporal, resp. rate 16, height 1.676 m (5' 6\"), weight 82.6 kg (182 lb), SpO2 97 %, not currently breastfeeding.  General:  Well nourished, well developed female in NAD  Head:Normocephalic, atraumatic  Eyes: PERRLA, EOM within normal limits, no conjunctival injection, " no scleral icterus, visual fields and acuity grossly intact.  Ears: Normal shape and symmetry, no tenderness, no discharge. External canals are without any significant edema or erythema. Tympanic membranes are without any inflammation, no effusion. Gross auditory acuity is intact  Nose: Symmetrical without tenderness, no discharge.  Mouth: reasonable hygiene, she has pharyngeal and tonsillar erythema, bilateral exudates and bilateral tonsillar enlargement.  Uvula is midline  Neck: There is bilateral anterior cervical lymph node enlargement and tenderness, range of motion within normal limits, no tracheal deviation. No obvious thyroid enlargement.  Pulmonary: chest is symmetrical with respiration, no wheezes, crackles, or rhonchi.  Cardiovascular: regular rate and rhythm without murmurs, rubs, or gallops.  Extremities: no clubbing, cyanosis, or edema.    Please note that this dictation was created using voice recognition software. I have made every reasonable attempt to correct obvious errors, but I expect that there are errors of grammar and possibly content that I did not discover before finalizing the note.    Assessment/Plan:  1. Strep pharyngitis  azithromycin (ZITHROMAX) 250 MG Tab   History and physical consistent with strep.  Tylenol or ibuprofen as tolerated    Followup with primary care in the next 7-10 days if not significantly improving, return to the urgent care or go to the emergency room sooner for any worsening of symptoms.

## 2019-01-28 ENCOUNTER — OFFICE VISIT (OUTPATIENT)
Dept: NEUROLOGY | Facility: MEDICAL CENTER | Age: 44
End: 2019-01-28
Payer: COMMERCIAL

## 2019-01-28 VITALS
HEART RATE: 62 BPM | SYSTOLIC BLOOD PRESSURE: 132 MMHG | WEIGHT: 177 LBS | RESPIRATION RATE: 14 BRPM | HEIGHT: 66 IN | TEMPERATURE: 96.2 F | OXYGEN SATURATION: 98 % | BODY MASS INDEX: 28.45 KG/M2 | DIASTOLIC BLOOD PRESSURE: 78 MMHG

## 2019-01-28 DIAGNOSIS — Z13.31 SCREENING FOR DEPRESSION: ICD-10-CM

## 2019-01-28 DIAGNOSIS — Z87.898 HISTORY OF SEIZURES: ICD-10-CM

## 2019-01-28 DIAGNOSIS — F39 MOOD DISORDER (HCC): ICD-10-CM

## 2019-01-28 DIAGNOSIS — Z02.4 ENCOUNTER FOR EXAMINATION FOR DRIVING LICENSE: ICD-10-CM

## 2019-01-28 PROCEDURE — 99214 OFFICE O/P EST MOD 30 MIN: CPT | Performed by: PSYCHIATRY & NEUROLOGY

## 2019-01-28 ASSESSMENT — PATIENT HEALTH QUESTIONNAIRE - PHQ9: CLINICAL INTERPRETATION OF PHQ2 SCORE: 0

## 2019-01-28 NOTE — PROGRESS NOTES
Chief Complaint   Patient presents with   • Follow-Up     Localization-related epilepsy        Problem List Items Addressed This Visit     None      Visit Diagnoses     History of seizures        Mood disorder (HCC)        Screening for depression        Encounter for examination for driving license              Interim history:  Angelika Hamilton returns in fu with . She is no longer on Xanax or Neurontin. She feels all her complaints of memory and anxiety are resolved. She denies any seizure like spells since 2016. She needs DMV clearance, states this should be the last time.     She is working. She is driving and denies any issues.     Her family is supportive.     She denies issues with mood at this time.     She still has back pain occasionally but no longer a big problem.     She denies any other neurological issues.       Past medical history:   Past Medical History:   Diagnosis Date   • Arthritis    • Hypertension    • Migraine    • Urinary tract infection        Past surgical history:   Past Surgical History:   Procedure Laterality Date   • ABDOMINAL HYSTERECTOMY TOTAL     • HYSTERECTOMY, TOTAL ABDOMINAL     • OTHER      gall bladder       Family history:   Family History   Problem Relation Age of Onset   • Heart Disease Paternal Grandmother    • Stroke Paternal Grandmother    • Arthritis Paternal Grandmother    • Arthritis Mother    • Arthritis Father    • Arthritis Maternal Grandmother    • Arthritis Maternal Grandfather    • Arthritis Paternal Grandfather    • Cancer Paternal Grandfather        Social history:   Social History     Social History   • Marital status:      Spouse name: N/A   • Number of children: N/A   • Years of education: N/A     Occupational History   • Not on file.     Social History Main Topics   • Smoking status: Never Smoker   • Smokeless tobacco: Never Used   • Alcohol use 0.6 oz/week     1 Shots of liquor per week   • Drug use: No   • Sexual activity: Yes      Partners: Male     Other Topics Concern   • Not on file     Social History Narrative   • No narrative on file       Current medications:   Current Outpatient Prescriptions   Medication   • estradiol (ESTRACE) 2 MG Tab   • oxycodone-acetaminophen (PERCOCET) 7.5-325 MG per tablet   • meloxicam (MOBIC) 7.5 MG Tab   • azithromycin (ZITHROMAX) 250 MG Tab   • nystatin (MYCOSTATIN) 534841 UNIT/GM Cream topical cream   • alprazolam (XANAX) 0.25 MG Tab   • ondansetron (ZOFRAN) 4 MG Tab tablet   • cyclobenzaprine (FLEXERIL) 10 MG Tab   • paroxetine (PAXIL) 20 MG Tab   • lisinopril (PRINIVIL) 10 MG Tab   • gabapentin (NEURONTIN) 600 MG tablet     No current facility-administered medications for this visit.        Medication Allergy:  Allergies   Allergen Reactions   • Sulfa Drugs Swelling     Patient states tongue swells   • Tape      Paper tape is fine         Review of systems:   Constitutional: denies fever, night sweats, weight loss.   Eyes: denies acute vision change, eye pain or secretion.   Ears, Nose, Mouth, Throat: denies nasal secretion, nasal bleeding, difficulty swallowing, hearing loss, tinnitus, vertigo, ear pain, acute dental problems, oral ulcers or lesions.   Endocrine: denies recent weight changes, heat or cold intolerance, polyuria, polydypsia, polyphagia,abnormal hair growth.  Cardiovascular: denies new onset of chest pain, palpitations, syncope, or dyspnea of exertion.  Pulmonary: denies shortness of breath, new onset of cough, hemoptysis, wheezing, chest pain or flu-like symptoms.   GI: denies nausea, vomiting, diarrhea, GI bleeding, change in appetite, abdominal pain, and change in bowel habits.  : denies dysuria, urinary incontinence, hematuria.  Heme/oncology: denies history of easy bruising or bleeding. No history of cancer, DVTor PE.  Allergy/immunology: denies hives/urticaria, or itching.   Dermatologic: denies new rash, or new skin lesions.  Musculoskeletal:denies joint swelling or pain, muscle  "pain, neck and back pain.   Neurologic: denies headaches, acute visual changes, facial droopiness, muscle weakness (focal or generalized), paresthesias, anesthesia, ataxia, change in speech or language, memory loss, abnormal movements, seizures, loss of consciousness, or episodes of confusion.   Psychiatric: denies symptoms of depression, anxiety, hallucinations, mood swings or changes, suicidal or homicidal thoughts.     Physical examination:   Vitals:    01/28/19 0936   BP: 132/78   BP Location: Left arm   Patient Position: Sitting   BP Cuff Size: Adult   Pulse: 62   Resp: 14   Temp: (!) 35.7 °C (96.2 °F)   TempSrc: Temporal   SpO2: 98%   Weight: 80.3 kg (177 lb)   Height: 1.676 m (5' 6\")     General: Patient in no acute distress, pleasant and cooperative. Overweight.    HEENT: Normocephalic, no signs of acute trauma.    Neck: supple, no meningeal signs or carotid bruits. There is normal range of motion. No tenderness on exam.    Chest: clear to auscultation. No cough.    CV: RRR, no murmurs.    Skin: no signs of acute rashes or trauma.    Musculoskeletal: joints exhibit full range of motion, without any pain to palpation. There are no signs of joint or muscle swelling. There is no tenderness to deep palpation of muscles.    Psychiatric: No hallucinatory behavior. Denies symptoms of depression or suicidal ideation. Mood and affect appear normal on exam.      NEUROLOGICAL EXAM:    Mental status, orientation: Awake, alert and fully oriented.    Speech and language: speech is clear and fluent. The patient is able to name, repeat and comprehend.    Memory: There is intact recollection of recent and remote events.    Cranial nerve exam: Pupils are 3-4 mm bilaterally and equally reactive to light and accommodation. Visual fields are intact by confrontation. There is no nystagmus on primary or secondary gaze. Intact full EOM in all directions of gaze. Face appears symmetric. Sensation in the face is intact to light " touch. Uvula is midline. Palate elevates symmetrically. Tongue is midline and without any signs of tongue biting or fasciculations. Sternocleidomastoid muscles exhibit is normal strength bilaterally. Shoulder shrug is intact bilaterally.    Motor exam: Strength is 5/5 in all extremities. Tone is normal. No abnormal movements were seen on exam.    Sensory exam reveals normal sense of light touch in all extremities.    Deep tendon reflexes:  2+ throughout. Plantar responses are flexor. There is no clonus.    Coordination: shows a normal finger-nose-finger. Normal rapidly alternating movements.    Gait: The patient was able to get up from seated position on first attempt without requiring assistance. Found to be steady when walking. Movements were fluid with normal arm swing. The patient was able to turn without difficulties or tendency to fall. Romberg exam shows mild swaying.           ANCILLARY DATA REVIEWED:     Lab Data Review:  TSH, folate, B6 and B12 were normal.         Imaging:    MRI brain w/wo 7/10/16:  1. MRI OF THE BRAIN WITHOUT AND WITH CONTRAST WITHIN NORMAL LIMITS.  2. NO EVIDENCE OF MASS LESION, HETEROTOPIC GRAY MATTER, GROSS CORTICAL DYSPLASIA, OR MESIAL TEMPORAL SCLEROSIS.        EEG, routine, 7/11/16:  This is a normal routine EEG recording in the awake only state. Clinical correlation is recommended.  Note: A normal EEG does not rule out epilepsy. If the clinical suspicion remains high for seizures, a prolonged recording to capture clinical or subclinical events may be helpful.        VEEG, 4/3/17 - 4/7/17  This is a normal 5 days video electroencephalogram recording in the awake, drowsy and sleep state.  No events were captured during the study. Clinical correlation is recommended.         ASSESSMENT AND PLAN:    1. History of seizures      2. Mood disorder (HCC)      3. Screening for depression      4. Encounter for examination for driving license        CLINICAL DISCUSSION:   Febrile seizures as  child.  Seizures only after she has been vomiting for a few days and unable to keep meds down (xanax). No longer on Xanax or Neurontin.   Suspect she withdrew from xanax at that time and had seizures as a result. Her VEEG was normal.    Memory much improved, likely was side effect from medications she did not need.   I have no indication she has epilepsy, but they are aware only time will tell.   She has no driving restrictions at this time, but is aware she must stop driving in the future immediately should she has another spell.   DMV form filled out and faxed.         FOLLOW-UP:   Return if symptoms worsen or fail to improve.           EDUCATION AND COUNSELING:  -Education was provided to the patient and/or family regarding diagnosis and prognosis. The chronic and unpredictable nature of the condition were discussed. There may increased risk for additional events?, which may carry potential for significant injuries and death.    -Counseling was also provided on potential effects of alcohol and other drugs, which may lower seizure threshold and/or affect the metabolism of antiepileptic drugs. We recommend avoidance of alcohol and illegal drugs. Denies use.    -We extensively discussed the aspects related to safety in drivers who suffer from epilepsy or seizures or other spells. The patient is encourage to report to the Division of Motor Vehicles of any condition and/or spells related to confusion, disorientation, and/or loss of awareness and/or loss of consciousness; as these may pose a safety issue if they occur while operating a motor vehicle. The patient and/or family are ultimately responsible for exercising caution and abiding to regulations in place. Continue to refrain from driving from now.    -Other precautions were discussed at length, including no diving, no skydiving, no climbing or exposure to unprotected heights, no unsupervised swimming, no Jacuzzi or bathing in bathtubs or deep bodies of water.       Patient and  agree with plan, as outlined.       John Strong MD   Epilepsy and Neurodiagnostics.   Clinical  of Neurology [facility].   Diplomate in Neurology, Epilepsy, and Electrodiagnostic Medicine.   Office: [phone]  Fax: [phone]    o BILLING DOCUMENTATION:   Counseling:  I spent greater than 50% time face-to-face time of a total of 41 mins visit. Over 50% of the time of the visit today was spent on counseling and or coordination of care wtih the patient/family, with greater than 50% of the total time discussing:   o Diagnostic results, impressions, and/or recommended diagnostic studies, and coordination of care.   o Prognosis.  o Treatment recommendations, including risks, benefits, & alternatives.  o Instructions for treatment/management and/or follow-up.  o Importance of compliance with chosen treatment/management options.  o Risk factor modification.   o Patient & family education.  o Provided business card and/or instructions for follow-up & emergencies.

## 2019-09-16 ENCOUNTER — NON-PROVIDER VISIT (OUTPATIENT)
Dept: URGENT CARE | Facility: PHYSICIAN GROUP | Age: 44
End: 2019-09-16

## 2019-09-16 DIAGNOSIS — Z02.1 PRE-EMPLOYMENT DRUG SCREENING: ICD-10-CM

## 2019-09-16 LAB
AMP AMPHETAMINE: NORMAL
COC COCAINE: NORMAL
INT CON NEG: NORMAL
INT CON POS: NORMAL
MET METHAMPHETAMINES: NORMAL
OPI OPIATES: NORMAL
PCP PHENCYCLIDINE: NORMAL
POC DRUG COMMENT 753798-OCCUPATIONAL HEALTH: NORMAL
THC: NORMAL

## 2019-09-16 PROCEDURE — 80305 DRUG TEST PRSMV DIR OPT OBS: CPT | Performed by: NURSE PRACTITIONER

## 2020-02-14 ENCOUNTER — OFFICE VISIT (OUTPATIENT)
Dept: NEUROLOGY | Facility: MEDICAL CENTER | Age: 45
End: 2020-02-14
Payer: COMMERCIAL

## 2020-02-14 VITALS
SYSTOLIC BLOOD PRESSURE: 120 MMHG | OXYGEN SATURATION: 96 % | HEIGHT: 66 IN | DIASTOLIC BLOOD PRESSURE: 86 MMHG | BODY MASS INDEX: 30.7 KG/M2 | HEART RATE: 78 BPM | WEIGHT: 191 LBS | TEMPERATURE: 96.8 F | RESPIRATION RATE: 16 BRPM

## 2020-02-14 DIAGNOSIS — Z87.898 HISTORY OF SEIZURES: ICD-10-CM

## 2020-02-14 DIAGNOSIS — Z13.31 SCREENING FOR DEPRESSION: ICD-10-CM

## 2020-02-14 DIAGNOSIS — F32.A DEPRESSION, UNSPECIFIED DEPRESSION TYPE: ICD-10-CM

## 2020-02-14 DIAGNOSIS — Z02.4 ENCOUNTER FOR EXAMINATION FOR DRIVING LICENSE: ICD-10-CM

## 2020-02-14 PROCEDURE — 99213 OFFICE O/P EST LOW 20 MIN: CPT | Performed by: NURSE PRACTITIONER

## 2020-02-14 NOTE — PROGRESS NOTES
Chief Complaint   Patient presents with   • Follow-Up     History of seizures       Problem List Items Addressed This Visit     None      Visit Diagnoses     History of seizures        Screening for depression        Encounter for examination for driving license        Depression, unspecified depression type              Interim history:  Angelika Hamilton returns in fu with  for DMV paperwork. She is a pt of Dr. Strong    Pt reports of no spells since 2016. She is not on any medication for seizures. Pt reports that she was told by Dr. Strong before that he doesn't think that she has epilepsy and she does not need to see him again unless she has further spells. She needs DMV paperwork again as DMV did not clear her.     Memory is better.      She is driving with no issues.     Mood is good. Has depression bu no suicidal or homicidal thoughts.    No other issues         Past medical history:   Past Medical History:   Diagnosis Date   • Arthritis    • Hypertension    • Migraine    • Urinary tract infection        Past surgical history:   Past Surgical History:   Procedure Laterality Date   • ABDOMINAL HYSTERECTOMY TOTAL     • HYSTERECTOMY, TOTAL ABDOMINAL     • OTHER      gall bladder       Family history:   Family History   Problem Relation Age of Onset   • Heart Disease Paternal Grandmother    • Stroke Paternal Grandmother    • Arthritis Paternal Grandmother    • Arthritis Mother    • Arthritis Father    • Arthritis Maternal Grandmother    • Arthritis Maternal Grandfather    • Arthritis Paternal Grandfather    • Cancer Paternal Grandfather        Social history:   Social History     Socioeconomic History   • Marital status:      Spouse name: Not on file   • Number of children: Not on file   • Years of education: Not on file   • Highest education level: Not on file   Occupational History   • Not on file   Social Needs   • Financial resource strain: Not on file   • Food insecurity     Worry: Not on file      Inability: Not on file   • Transportation needs     Medical: Not on file     Non-medical: Not on file   Tobacco Use   • Smoking status: Never Smoker   • Smokeless tobacco: Never Used   Substance and Sexual Activity   • Alcohol use: Yes     Alcohol/week: 0.6 oz     Types: 1 Shots of liquor per week   • Drug use: No   • Sexual activity: Yes     Partners: Male   Lifestyle   • Physical activity     Days per week: Not on file     Minutes per session: Not on file   • Stress: Not on file   Relationships   • Social connections     Talks on phone: Not on file     Gets together: Not on file     Attends Christianity service: Not on file     Active member of club or organization: Not on file     Attends meetings of clubs or organizations: Not on file     Relationship status: Not on file   • Intimate partner violence     Fear of current or ex partner: Not on file     Emotionally abused: Not on file     Physically abused: Not on file     Forced sexual activity: Not on file   Other Topics Concern   • Not on file   Social History Narrative   • Not on file       Current medications:   Current Outpatient Medications   Medication   • meloxicam (MOBIC) 7.5 MG Tab   • nystatin (MYCOSTATIN) 823761 UNIT/GM Cream topical cream   • estradiol (ESTRACE) 2 MG Tab   • ondansetron (ZOFRAN) 4 MG Tab tablet   • cyclobenzaprine (FLEXERIL) 10 MG Tab   • paroxetine (PAXIL) 20 MG Tab   • lisinopril (PRINIVIL) 10 MG Tab   • oxycodone-acetaminophen (PERCOCET) 7.5-325 MG per tablet     No current facility-administered medications for this visit.        Medication Allergy:  Allergies   Allergen Reactions   • Sulfa Drugs Swelling     Patient states tongue swells   • Tape      Paper tape is fine           Review of systems:     General: Denies fevers or chills, or nightsweats, or generalized fatigue.    Head: Denies headaches or dizziness or lightheadedness  EENT: Denies vision changes, vision loss or pain, nasal secretion, nasal bleeding, difficulty  "swallowing, hearing loss, tinnitus, vertigo, ear pain  Respiratory: Denies shortness of breath, cough, sputum, or wheezing  Cardiac: Denies chest pain, palpitations, edema or syncope  Gastrointestinal: Denies nausea, vomiting, no abdominal pain or change in bowel habits, no melena or hematochezia  Urinary: Denies dysuria, frequency, hesitancy, or incontinence.  Dermatologic:  Denies new rash  Musculoskeletal: Denies muscle pain or swelling, no atrophy, no neck and back pain or stiffness.   Neurologic: Denies facial droopiness, muscle weakness (focal or generalized), paresthesias, ataxia, change in speech or language, memory loss, abnormal movements, seizures, loss of consciousness, or episodes of confusion.   Psychiatric: Denies anxiety, mood swings, suicidal or homicidal thoughts       Physical examination:   Vitals:    02/14/20 1431   BP: 120/86   BP Location: Left arm   Patient Position: Sitting   BP Cuff Size: Adult   Pulse: 78   Resp: 16   Temp: 36 °C (96.8 °F)   TempSrc: Temporal   SpO2: 96%   Weight: 86.6 kg (191 lb)   Height: 1.676 m (5' 6\")     General: Patient in no acute distress, pleasant and cooperative.  HEENT: Normocephalic, no signs of acute trauma.   moist conjunctivae. Nares are patent. Oropharynx clear without lesions and normal  hard and soft palates.   Neck: Supple. There is normal range of motion.   Resp: clear to auscultation bilaterally. No wheezes or crackles.   CV: RRR, no murmurs.   Skin: no signs of acute rashes or trauma.   Musculoskeletal: joints exhibit full range of motion, without any pain to palpation. There are no signs of joint or muscle swelling. There is no tenderness to deep palpation of muscles.   Psychiatric: No hallucinatory behavior. No symptoms of depression or suicidal ideation. Mood and affect appear normal on exam.     NEUROLOGICAL EXAM:   Mental status, orientation: Awake, alert and fully oriented.   Speech and language: speech is clear and fluent. The patient is able " to name, repeat and comprehend.   Memory: There is intact recollection of recent and remote events.   Cranial nerve exam:   CN I: Not examined   CN II: PERRL.  CN III, IV, VI: EOMI; no nystagmus   CN V: Facial sensation intact bilaterally   CN VII: face symmetric   CN VIII: hearing intact to finger rub bilaterally   CN IX, X: palate elevates symmetrically   CN XI: Symmetric shoulder shrug  CN XII: tongue midline. No signs of tongue biting or fasciculations   Motor exam: Strength is 5/5 in all extremities. Tone is normal. No abnormal movements were seen on exam.   Sensory exam reveals normal sense of light touch in all extremities.   Deep tendon reflexes:  absent ankle jerks, 2+ throughout.  Coordination: shows a normal finger-nose-finger. Normal rapidly alternating movements.   Gait: The patient was able to get up from seated position on first attempt without requiring assistance. Found to be steady when walking. Movements were fluid with normal arm swing. The patient was able to turn without difficulties or tendency to fall. Romberg exam mildly swaying      ANCILLARY DATA REVIEWED:       Lab Data Review:  Reviewed in chart.     Records reviewed:   Reviewed in chart.    Imaging:    MRI brain w/wo 7/10/16:  1. MRI OF THE BRAIN WITHOUT AND WITH CONTRAST WITHIN NORMAL LIMITS.  2. NO EVIDENCE OF MASS LESION, HETEROTOPIC GRAY MATTER, GROSS CORTICAL DYSPLASIA, OR MESIAL TEMPORAL SCLEROSIS.        EEG, routine, 7/11/16:  This is a normal routine EEG recording in the awake only state. Clinical correlation is recommended.  Note: A normal EEG does not rule out epilepsy. If the clinical suspicion remains high for seizures, a prolonged recording to capture clinical or subclinical events may be helpful.        VEEG, 4/3/17 - 4/7/17  This is a normal 5 days video electroencephalogram recording in the awake, drowsy and sleep state.  No events were captured during the study. Clinical correlation is recommended.        ASSESSMENT  AND PLAN:    1. History of seizures    2. Screening for depression    3. Encounter for examination for driving license    4. Depression, unspecified depression type          CLINICAL DISCUSSION:   Febrile seizures as child. Seizures only after she has been vomiting for a few days and unable to keep meds down (xanax). No longer on Xanax or Neurontin. Dr. Strong suspects that she withdrew from xanax at that time and had seizures as a result. Her VEEG was normal. Dr. Strong does not think pt has epilepsy but pt is aware that only time will tell. She has requested for another DMV clearance again. She has no driving restrictions. She continues to have no spells. She is aware to stop if she has another spell in the future. DMV paperwork faxed and copy given to pt. No need for yearly renewal.           FOLLOW-UP:   Return if symptoms worsen or fail to improve, for with Dr. Strong.          EDUCATION AND COUNSELING:  -Education was provided to the patient and/or family regarding diagnosis and prognosis. The chronic and unpredictable nature of the condition were discussed. There is increased risk for additional events, which may carry potential for significant injuries and death. Discussed frequent seizure triggers: sleep deprivation, medication non-compliance, use of illegal drugs/alcohol, stress, and others.   -The patient understands and agrees that due to the complexity of his/her diagnosis, results of any testing and further recommendations will typically be discussed/made during a face to face encounter in my office. The patient and/or family further understands it is their responsibility to keep proper follow up.     Patient/family agree with plan, as outlined.         Lindsey Warner, MSN, APRN, FNP-C  [facility]  Office: [phone]  Fax: [phone]

## 2020-06-12 ENCOUNTER — OFFICE VISIT (OUTPATIENT)
Dept: URGENT CARE | Facility: PHYSICIAN GROUP | Age: 45
End: 2020-06-12
Payer: COMMERCIAL

## 2020-06-12 VITALS
DIASTOLIC BLOOD PRESSURE: 86 MMHG | BODY MASS INDEX: 33.27 KG/M2 | SYSTOLIC BLOOD PRESSURE: 124 MMHG | HEART RATE: 112 BPM | OXYGEN SATURATION: 98 % | RESPIRATION RATE: 18 BRPM | HEIGHT: 66 IN | WEIGHT: 207 LBS | TEMPERATURE: 99.1 F

## 2020-06-12 DIAGNOSIS — M77.50 TENDONITIS OF FOOT: ICD-10-CM

## 2020-06-12 PROCEDURE — 99214 OFFICE O/P EST MOD 30 MIN: CPT | Performed by: PHYSICIAN ASSISTANT

## 2020-06-12 RX ORDER — METHYLPREDNISOLONE 4 MG/1
4 TABLET ORAL SEE ADMIN INSTRUCTIONS
Qty: 21 TAB | Refills: 0 | Status: SHIPPED | OUTPATIENT
Start: 2020-06-12 | End: 2021-12-10

## 2020-06-13 NOTE — PROGRESS NOTES
Chief Complaint   Patient presents with   • Foot Pain     L foot       HISTORY OF PRESENT ILLNESS: Patient is a 44 y.o. female who presents today because She has a 4-day history of pain to the bottom of her left foot in the arch and towards the first MTP joint.  This started with an incident where she was stepping down off of a step and felt pain and a popping sensation.  It seemed to be getting better but when she is walking today it happened again.  She has been using anti-inflammatories which seem to have been helping.  Denies any distal paresthesias.    Patient Active Problem List    Diagnosis Date Noted   • Seizure (HCC) 04/07/2017   • Memory loss 04/07/2017   • Headache 07/10/2016   • Elevated CSF protein 07/10/2016   • New onset seizure (HCC) 07/09/2016   • Chronic fatigue and malaise 03/20/2016   • Hypertriglyceridemia 03/20/2016   • Elevated C-reactive protein (CRP) 03/17/2016   • Hormone replacement therapy (HRT) 02/10/2016   • Chronic back pain 02/10/2016   • Anxiety 02/10/2016   • Hypertension 02/10/2016   • Obesity 02/10/2016   • Insomnia 02/10/2016   • Chronic diarrhea 02/10/2016       Allergies:Sulfa drugs and Tape    Current Outpatient Medications Ordered in Epic   Medication Sig Dispense Refill   • methylPREDNISolone (MEDROL DOSEPAK) 4 MG Tablet Therapy Pack Take 1 Tab by mouth See Admin Instructions. 21 Tab 0   • meloxicam (MOBIC) 7.5 MG Tab Take 7.5 mg by mouth every day.     • nystatin (MYCOSTATIN) 179511 UNIT/GM Cream topical cream Apply cream topically to affected areas BID as needed 40 g 1   • estradiol (ESTRACE) 2 MG Tab Take 1 Tab by mouth every day. 30 Tab 11   • ondansetron (ZOFRAN) 4 MG Tab tablet Take 1 Tab by mouth every 8 hours as needed for Nausea/Vomiting. 60 Tab 5   • cyclobenzaprine (FLEXERIL) 10 MG Tab Take 10 mg by mouth 3 times a day as needed.     • paroxetine (PAXIL) 20 MG Tab Take 20 mg by mouth every day.     • lisinopril (PRINIVIL) 10 MG Tab Take 10 mg by mouth every day.    "  • oxycodone-acetaminophen (PERCOCET) 7.5-325 MG per tablet Take 1 Tab by mouth every 8 hours as needed for Severe Pain. 1 Tab 0     No current Epic-ordered facility-administered medications on file.        Past Medical History:   Diagnosis Date   • Arthritis    • Hypertension    • Migraine    • Urinary tract infection        Social History     Tobacco Use   • Smoking status: Never Smoker   • Smokeless tobacco: Never Used   Substance Use Topics   • Alcohol use: Yes     Alcohol/week: 0.6 oz     Types: 1 Shots of liquor per week   • Drug use: No       Family Status   Relation Name Status   • PGMo     • Mo  Alive   • Fa  Alive   • Sis  Alive   • Bro  Alive   • MAunt  Alive   • MUnc  Alive   • PAunt  Alive   • PUnc  Alive   • MGMo     • MGFa     • PGFa       Family History   Problem Relation Age of Onset   • Heart Disease Paternal Grandmother    • Stroke Paternal Grandmother    • Arthritis Paternal Grandmother    • Arthritis Mother    • Arthritis Father    • Arthritis Maternal Grandmother    • Arthritis Maternal Grandfather    • Arthritis Paternal Grandfather    • Cancer Paternal Grandfather        ROS:  Review of Systems   Constitutional: Negative for fever, chills, weight loss and malaise/fatigue.   HENT: Negative for ear pain, nosebleeds, congestion, sore throat and neck pain.    Eyes: Negative for blurred vision.   Respiratory: Negative for cough, sputum production, shortness of breath and wheezing.    Cardiovascular: Negative for chest pain, palpitations, orthopnea and leg swelling.   Gastrointestinal: Negative for heartburn, nausea, vomiting and abdominal pain.   Genitourinary: Negative for dysuria, urgency and frequency.     Exam:  /86 (BP Location: Right arm, Patient Position: Sitting, BP Cuff Size: Adult)   Pulse (!) 112   Temp 37.3 °C (99.1 °F) (Temporal)   Resp 18   Ht 1.676 m (5' 6\")   Wt 93.9 kg (207 lb)   SpO2 98%   General:  Well nourished, well developed " female in NAD  Head:Normocephalic, atraumatic  Eyes: PERRLA, EOM within normal limits, no conjunctival injection, no scleral icterus, visual fields and acuity grossly intact.  Nose: Symmetrical without tenderness, no discharge.  Mouth: reasonable hygiene, no erythema exudates or tonsillar enlargement.  Neck: no masses, range of motion within normal limits, no tracheal deviation. No obvious thyroid enlargement.  Extremities: no clubbing, cyanosis, or edema.Left foot is without any visual deformity, erythema, edema or ecchymosis.  She has good distal range of motion, strength, circulation and sensation.  She does have some tenderness to palpation along the medial arch, particularly underneath the distal portion of the first MTP joint    Please note that this dictation was created using voice recognition software. I have made every reasonable attempt to correct obvious errors, but I expect that there are errors of grammar and possibly content that I did not discover before finalizing the note.    Assessment/Plan:  1. Tendonitis of foot  methylPREDNISolone (MEDROL DOSEPAK) 4 MG Tablet Therapy Pack   Ice, gentle range of motion, stretching    Followup with primary care in the next 7-10 days if not significantly improving, return to the urgent care or go to the emergency room sooner for any worsening of symptoms.

## 2020-07-10 ENCOUNTER — OFFICE VISIT (OUTPATIENT)
Dept: URGENT CARE | Facility: PHYSICIAN GROUP | Age: 45
End: 2020-07-10
Payer: COMMERCIAL

## 2020-07-10 VITALS
HEART RATE: 104 BPM | BODY MASS INDEX: 32.78 KG/M2 | WEIGHT: 204 LBS | HEIGHT: 66 IN | RESPIRATION RATE: 16 BRPM | SYSTOLIC BLOOD PRESSURE: 140 MMHG | DIASTOLIC BLOOD PRESSURE: 98 MMHG | OXYGEN SATURATION: 97 % | TEMPERATURE: 99.4 F

## 2020-07-10 DIAGNOSIS — J01.90 ACUTE BACTERIAL SINUSITIS: ICD-10-CM

## 2020-07-10 DIAGNOSIS — B96.89 ACUTE BACTERIAL SINUSITIS: ICD-10-CM

## 2020-07-10 PROCEDURE — 99214 OFFICE O/P EST MOD 30 MIN: CPT | Performed by: PHYSICIAN ASSISTANT

## 2020-07-10 RX ORDER — AMOXICILLIN AND CLAVULANATE POTASSIUM 875; 125 MG/1; MG/1
1 TABLET, FILM COATED ORAL 2 TIMES DAILY
Qty: 14 TAB | Refills: 0 | Status: SHIPPED | OUTPATIENT
Start: 2020-07-10 | End: 2020-07-17

## 2020-07-10 NOTE — PROGRESS NOTES
Chief Complaint   Patient presents with   • Sinus Problem       HISTORY OF PRESENT ILLNESS: Patient is a 44 y.o. female who presents today because is a 1-1/2 to 2-week history of worsening nasal and sinus pain, pressure, drainage and congestion.  Denies any fevers, chills, nausea, vomiting or diarrhea, denies any significant cough.  She has been taking over-the-counter antihistamines and decongestants and nasal sprays without improvement    Patient Active Problem List    Diagnosis Date Noted   • Seizure (HCC) 04/07/2017   • Memory loss 04/07/2017   • Headache 07/10/2016   • Elevated CSF protein 07/10/2016   • New onset seizure (HCC) 07/09/2016   • Chronic fatigue and malaise 03/20/2016   • Hypertriglyceridemia 03/20/2016   • Elevated C-reactive protein (CRP) 03/17/2016   • Hormone replacement therapy (HRT) 02/10/2016   • Chronic back pain 02/10/2016   • Anxiety 02/10/2016   • Hypertension 02/10/2016   • Obesity 02/10/2016   • Insomnia 02/10/2016   • Chronic diarrhea 02/10/2016       Allergies:Sulfa drugs and Tape    Current Outpatient Medications Ordered in Epic   Medication Sig Dispense Refill   • Pregabalin (LYRICA PO) Take  by mouth.     • amoxicillin-clavulanate (AUGMENTIN) 875-125 MG Tab Take 1 Tab by mouth 2 times a day for 7 days. 14 Tab 0   • estradiol (ESTRACE) 2 MG Tab Take 1 Tab by mouth every day. 30 Tab 11   • oxycodone-acetaminophen (PERCOCET) 7.5-325 MG per tablet Take 1 Tab by mouth every 8 hours as needed for Severe Pain. 1 Tab 0   • methylPREDNISolone (MEDROL DOSEPAK) 4 MG Tablet Therapy Pack Take 1 Tab by mouth See Admin Instructions. 21 Tab 0   • meloxicam (MOBIC) 7.5 MG Tab Take 7.5 mg by mouth every day.     • nystatin (MYCOSTATIN) 136063 UNIT/GM Cream topical cream Apply cream topically to affected areas BID as needed 40 g 1   • ondansetron (ZOFRAN) 4 MG Tab tablet Take 1 Tab by mouth every 8 hours as needed for Nausea/Vomiting. 60 Tab 5   • cyclobenzaprine (FLEXERIL) 10 MG Tab Take 10 mg by  mouth 3 times a day as needed.     • paroxetine (PAXIL) 20 MG Tab Take 20 mg by mouth every day.     • lisinopril (PRINIVIL) 10 MG Tab Take 10 mg by mouth every day.       No current [facility]-ordered facility-administered medications on file.        Past Medical History:   Diagnosis Date   • Arthritis    • Hypertension    • Migraine    • Urinary tract infection        Social History     Tobacco Use   • Smoking status: Never Smoker   • Smokeless tobacco: Never Used   Substance Use Topics   • Alcohol use: Yes     Alcohol/week: 0.6 oz     Types: 1 Shots of liquor per week   • Drug use: No       Family Status   Relation Name Status   • PGMo     • Mo  Alive   • Fa  Alive   • Sis  Alive   • Bro  Alive   • MAunt  Alive   • MUnc  Alive   • PAunt  Alive   • PUnc  Alive   • MGMo     • MGFa     • PGFa       Family History   Problem Relation Age of Onset   • Heart Disease Paternal Grandmother    • Stroke Paternal Grandmother    • Arthritis Paternal Grandmother    • Arthritis Mother    • Arthritis Father    • Arthritis Maternal Grandmother    • Arthritis Maternal Grandfather    • Arthritis Paternal Grandfather    • Cancer Paternal Grandfather        ROS:  Review of Systems   Constitutional: Negative for fever, chills, weight loss and malaise/fatigue.   HENT: Positive for bilateral ear pain, no nosebleeds, positive for nasal and sinus congestion, no sore throat and neck pain.    Eyes: Negative for blurred vision.   Respiratory: Negative for cough, sputum production, shortness of breath and wheezing.    Cardiovascular: Negative for chest pain, palpitations, orthopnea and leg swelling.   Gastrointestinal: Negative for heartburn, nausea, vomiting and abdominal pain.   Genitourinary: Negative for dysuria, urgency and frequency.     Exam:  /98 (BP Location: Right arm, Patient Position: Sitting, BP Cuff Size: Large adult)   Pulse (!) 104   Temp 37.4 °C (99.4 °F) (Temporal)   Resp 16   Ht 1.676 m  "(5' 6\")   Wt 92.5 kg (204 lb)   SpO2 97%   General:  Well nourished, well developed female in NAD  Head:Normocephalic, atraumatic  Eyes: PERRLA, EOM within normal limits, no conjunctival injection, no scleral icterus, visual fields and acuity grossly intact.  Ears: Normal shape and symmetry, no tenderness, no discharge. External canals are without any significant edema or erythema. Tympanic membranes are without any inflammation, moderate amount of cloudy fluid behind the tympanic membranes bilaterally.. Gross auditory acuity is intact  Nose: Symmetrical without tenderness, no discharge.  Nasal mucosa on the left is erythematous and edematous with posterior nasal cavity exudate.  Mouth: reasonable hygiene, no erythema exudates or tonsillar enlargement.  Neck: no masses, range of motion within normal limits, no tracheal deviation. No obvious thyroid enlargement.  Pulmonary: chest is symmetrical with respiration, no wheezes, crackles, or rhonchi.  Cardiovascular: regular rate and rhythm without murmurs, rubs, or gallops.  Extremities: no clubbing, cyanosis, or edema.    Please note that this dictation was created using voice recognition software. I have made every reasonable attempt to correct obvious errors, but I expect that there are errors of grammar and possibly content that I did not discover before finalizing the note.    Assessment/Plan:  1. Acute bacterial sinusitis  amoxicillin-clavulanate (AUGMENTIN) 875-125 MG Tab   May continue over-the-counter antihistamines and decongestants as needed/tolerated.    Followup with primary care in the next 7-10 days if not significantly improving, return to the urgent care or go to the emergency room sooner for any worsening of symptoms.       "

## 2020-07-21 ENCOUNTER — SUPERVISING PHYSICIAN REVIEW (OUTPATIENT)
Dept: URGENT CARE | Facility: PHYSICIAN GROUP | Age: 45
End: 2020-07-21

## 2020-09-03 ENCOUNTER — OFFICE VISIT (OUTPATIENT)
Dept: URGENT CARE | Facility: PHYSICIAN GROUP | Age: 45
End: 2020-09-03
Payer: COMMERCIAL

## 2020-09-03 VITALS
SYSTOLIC BLOOD PRESSURE: 130 MMHG | HEIGHT: 66 IN | OXYGEN SATURATION: 97 % | HEART RATE: 116 BPM | BODY MASS INDEX: 34.23 KG/M2 | TEMPERATURE: 96.8 F | DIASTOLIC BLOOD PRESSURE: 96 MMHG | WEIGHT: 213 LBS | RESPIRATION RATE: 16 BRPM

## 2020-09-03 DIAGNOSIS — S96.912A MUSCLE STRAIN OF LEFT FOOT, INITIAL ENCOUNTER: ICD-10-CM

## 2020-09-03 PROCEDURE — 99214 OFFICE O/P EST MOD 30 MIN: CPT | Performed by: PHYSICIAN ASSISTANT

## 2020-09-03 NOTE — PROGRESS NOTES
Chief Complaint   Patient presents with   • Foot Pain       HISTORY OF PRESENT ILLNESS: Patient is a 45 y.o. female who presents today because pain over the top of her left foot.  This is been going on for almost a week after she stepped in a hole and rolled her left foot.  She denies any distal paresthesias.  She was recently had a Medrol Dosepak for other issues and she wanted to wait until that was finished to be evaluated.  She has been using meloxicam that she uses on a regular basis for other issues.  She has some Voltaren gel at home but has not been using it.  She has been icing it without improvement    Patient Active Problem List    Diagnosis Date Noted   • Seizure (HCC) 04/07/2017   • Memory loss 04/07/2017   • Headache 07/10/2016   • Elevated CSF protein 07/10/2016   • New onset seizure (HCC) 07/09/2016   • Chronic fatigue and malaise 03/20/2016   • Hypertriglyceridemia 03/20/2016   • Elevated C-reactive protein (CRP) 03/17/2016   • Hormone replacement therapy (HRT) 02/10/2016   • Chronic back pain 02/10/2016   • Anxiety 02/10/2016   • Hypertension 02/10/2016   • Obesity 02/10/2016   • Insomnia 02/10/2016   • Chronic diarrhea 02/10/2016       Allergies:Sulfa drugs and Tape    Current Outpatient Medications Ordered in Epic   Medication Sig Dispense Refill   • Escitalopram Oxalate (LEXAPRO PO) Take  by mouth.     • Pregabalin (LYRICA PO) Take  by mouth.     • estradiol (ESTRACE) 2 MG Tab Take 1 Tab by mouth every day. 30 Tab 11   • oxycodone-acetaminophen (PERCOCET) 7.5-325 MG per tablet Take 1 Tab by mouth every 8 hours as needed for Severe Pain. 1 Tab 0   • methylPREDNISolone (MEDROL DOSEPAK) 4 MG Tablet Therapy Pack Take 1 Tab by mouth See Admin Instructions. 21 Tab 0   • meloxicam (MOBIC) 7.5 MG Tab Take 7.5 mg by mouth every day.     • nystatin (MYCOSTATIN) 012517 UNIT/GM Cream topical cream Apply cream topically to affected areas BID as needed 40 g 1   • ondansetron (ZOFRAN) 4 MG Tab tablet Take 1  Tab by mouth every 8 hours as needed for Nausea/Vomiting. 60 Tab 5   • cyclobenzaprine (FLEXERIL) 10 MG Tab Take 10 mg by mouth 3 times a day as needed.     • paroxetine (PAXIL) 20 MG Tab Take 20 mg by mouth every day.     • lisinopril (PRINIVIL) 10 MG Tab Take 10 mg by mouth every day.       No current [facility]-ordered facility-administered medications on file.        Past Medical History:   Diagnosis Date   • Arthritis    • Hypertension    • Migraine    • Urinary tract infection        Social History     Tobacco Use   • Smoking status: Never Smoker   • Smokeless tobacco: Never Used   Substance Use Topics   • Alcohol use: Yes     Alcohol/week: 0.6 oz     Types: 1 Shots of liquor per week   • Drug use: No       Family Status   Relation Name Status   • PGMo     • Mo  Alive   • Fa  Alive   • Sis  Alive   • Bro  Alive   • MAunt  Alive   • MUnc  Alive   • PAunt  Alive   • PUnc  Alive   • MGMo     • MGFa     • PGFa       Family History   Problem Relation Age of Onset   • Heart Disease Paternal Grandmother    • Stroke Paternal Grandmother    • Arthritis Paternal Grandmother    • Arthritis Mother    • Arthritis Father    • Arthritis Maternal Grandmother    • Arthritis Maternal Grandfather    • Arthritis Paternal Grandfather    • Cancer Paternal Grandfather        ROS:  Review of Systems   Constitutional: Negative for fever, chills, weight loss and malaise/fatigue.   HENT: Negative for ear pain, nosebleeds, congestion, sore throat and neck pain.    Eyes: Negative for blurred vision.   Respiratory: Negative for cough, sputum production, shortness of breath and wheezing.    Cardiovascular: Negative for chest pain, palpitations, orthopnea and leg swelling.   Gastrointestinal: Negative for heartburn, nausea, vomiting and abdominal pain.   Genitourinary: Negative for dysuria, urgency and frequency.     Exam:  /96 (BP Location: Right arm, Patient Position: Sitting, BP Cuff Size: Large adult)    "Pulse (!) 116   Temp 36 °C (96.8 °F) (Temporal)   Resp 16   Ht 1.676 m (5' 6\")   Wt 96.6 kg (213 lb)   SpO2 97%   General:  Well nourished, well developed female in NAD  Head:Normocephalic, atraumatic  Eyes: PERRLA, EOM within normal limits, no conjunctival injection, no scleral icterus, visual fields and acuity grossly intact.  Nose: Symmetrical without tenderness, no discharge.  Mouth: reasonable hygiene, no erythema exudates or tonsillar enlargement.  Neck: no masses, range of motion within normal limits, no tracheal deviation. No obvious thyroid enlargement.  Extremities: no clubbing, cyanosis, or edema.  Left foot is without any visual deformity, erythema, edema or ecchymosis.  She has good distal range of motion, strength, circulation and sensation.  She has tenderness to palpation to the distal portion of the fourth metatarsal    Please note that this dictation was created using voice recognition software. I have made every reasonable attempt to correct obvious errors, but I expect that there are errors of grammar and possibly content that I did not discover before finalizing the note.    Assessment/Plan:  1. Muscle strain of left foot, initial encounter  DX-FOOT-COMPLETE 3+ LEFT   X-ray pending, recommended using Voltaren gel, gentle range of motion and ice.    Followup with primary care in the next 7-10 days if not significantly improving, return to the urgent care or go to the emergency room sooner for any worsening of symptoms.       "

## 2020-09-04 ENCOUNTER — APPOINTMENT (OUTPATIENT)
Dept: RADIOLOGY | Facility: IMAGING CENTER | Age: 45
End: 2020-09-04
Attending: PHYSICIAN ASSISTANT
Payer: COMMERCIAL

## 2020-09-04 ENCOUNTER — APPOINTMENT (OUTPATIENT)
Dept: URGENT CARE | Facility: PHYSICIAN GROUP | Age: 45
End: 2020-09-04
Payer: COMMERCIAL

## 2020-09-04 DIAGNOSIS — S96.912A MUSCLE STRAIN OF LEFT FOOT, INITIAL ENCOUNTER: ICD-10-CM

## 2020-09-04 PROCEDURE — 73630 X-RAY EXAM OF FOOT: CPT | Mod: TC,FY,LT | Performed by: FAMILY MEDICINE

## 2021-07-02 ENCOUNTER — OFFICE VISIT (OUTPATIENT)
Dept: URGENT CARE | Facility: PHYSICIAN GROUP | Age: 46
End: 2021-07-02
Payer: COMMERCIAL

## 2021-07-02 VITALS
BODY MASS INDEX: 36.65 KG/M2 | SYSTOLIC BLOOD PRESSURE: 120 MMHG | HEART RATE: 90 BPM | OXYGEN SATURATION: 97 % | RESPIRATION RATE: 14 BRPM | WEIGHT: 220 LBS | DIASTOLIC BLOOD PRESSURE: 84 MMHG | TEMPERATURE: 97.9 F | HEIGHT: 65 IN

## 2021-07-02 DIAGNOSIS — B37.0 ORAL THRUSH: ICD-10-CM

## 2021-07-02 PROCEDURE — 99213 OFFICE O/P EST LOW 20 MIN: CPT | Performed by: NURSE PRACTITIONER

## 2021-07-02 ASSESSMENT — ENCOUNTER SYMPTOMS
COUGH: 0
HEADACHES: 0
VOMITING: 0
CHILLS: 0
FEVER: 0
SORE THROAT: 1
NAUSEA: 0
MYALGIAS: 0

## 2021-07-02 NOTE — PROGRESS NOTES
Subjective:     Angelika Hamilton is a 45 y.o. female who presents for Thrush (mouth and throat )      HPI  Pt presents for evaluation of a new problem. Angelika comes to the clinic today with complaints of mouth and throat pain.  Her pain is worse with swallowing.  She notes her pain is a 3/10.  She states that she has had episodes of thrush in the past.  She has not used anything for the relief of her symptoms.  She denies fever, chills, nausea/vomiting, headache or ear pain.      Review of Systems   Constitutional: Negative for chills and fever.   HENT: Positive for sore throat. Negative for ear pain.    Respiratory: Negative for cough.    Gastrointestinal: Negative for nausea and vomiting.   Musculoskeletal: Negative for myalgias.   Neurological: Negative for headaches.       PMH:   Past Medical History:   Diagnosis Date   • Arthritis    • Hypertension    • Migraine    • Urinary tract infection      ALLERGIES:   Allergies   Allergen Reactions   • Sulfa Drugs Swelling     Patient states tongue swells   • Tape      Paper tape is fine       SURGHX:   Past Surgical History:   Procedure Laterality Date   • ABDOMINAL HYSTERECTOMY TOTAL     • HYSTERECTOMY, TOTAL ABDOMINAL     • OTHER      gall bladder     SOCHX:   Social History     Socioeconomic History   • Marital status:      Spouse name: Not on file   • Number of children: Not on file   • Years of education: Not on file   • Highest education level: Not on file   Occupational History   • Not on file   Tobacco Use   • Smoking status: Never Smoker   • Smokeless tobacco: Never Used   Vaping Use   • Vaping Use: Never used   Substance and Sexual Activity   • Alcohol use: Yes     Alcohol/week: 0.6 oz     Types: 1 Shots of liquor per week   • Drug use: No   • Sexual activity: Yes     Partners: Male   Other Topics Concern   • Not on file   Social History Narrative   • Not on file     Social Determinants of Health     Financial Resource Strain:    • Difficulty of Paying  "Living Expenses:    Food Insecurity:    • Worried About Running Out of Food in the Last Year:    • Ran Out of Food in the Last Year:    Transportation Needs:    • Lack of Transportation (Medical):    • Lack of Transportation (Non-Medical):    Physical Activity:    • Days of Exercise per Week:    • Minutes of Exercise per Session:    Stress:    • Feeling of Stress :    Social Connections:    • Frequency of Communication with Friends and Family:    • Frequency of Social Gatherings with Friends and Family:    • Attends Baptism Services:    • Active Member of Clubs or Organizations:    • Attends Club or Organization Meetings:    • Marital Status:    Intimate Partner Violence:    • Fear of Current or Ex-Partner:    • Emotionally Abused:    • Physically Abused:    • Sexually Abused:      FH:   Family History   Problem Relation Age of Onset   • Heart Disease Paternal Grandmother    • Stroke Paternal Grandmother    • Arthritis Paternal Grandmother    • Arthritis Mother    • Arthritis Father    • Arthritis Maternal Grandmother    • Arthritis Maternal Grandfather    • Arthritis Paternal Grandfather    • Cancer Paternal Grandfather          Objective:   /84 (BP Location: Left arm, Patient Position: Sitting, BP Cuff Size: Adult)   Pulse 90   Temp 36.6 °C (97.9 °F) (Temporal)   Resp 14   Ht 1.651 m (5' 5\")   Wt 99.8 kg (220 lb)   SpO2 97%   BMI 36.61 kg/m²     Physical Exam  Vitals and nursing note reviewed.   Constitutional:       General: She is not in acute distress.     Appearance: Normal appearance. She is not ill-appearing.   HENT:      Head: Normocephalic and atraumatic.      Right Ear: External ear normal.      Left Ear: External ear normal.      Nose: No congestion or rhinorrhea.      Mouth/Throat:      Mouth: Mucous membranes are moist.      Pharynx: Uvula midline. Pharyngeal swelling and posterior oropharyngeal erythema present. No oropharyngeal exudate or uvula swelling.      Tonsils: No tonsillar " exudate or tonsillar abscesses. 1+ on the right. 1+ on the left.      Comments: Positive for satellite lesions and erythema of tongue, roof of mouth and posterior pharynx.  Eyes:      Extraocular Movements: Extraocular movements intact.      Pupils: Pupils are equal, round, and reactive to light.   Cardiovascular:      Rate and Rhythm: Normal rate and regular rhythm.      Pulses: Normal pulses.      Heart sounds: Normal heart sounds.   Pulmonary:      Effort: Pulmonary effort is normal.      Breath sounds: Normal breath sounds.   Abdominal:      General: Abdomen is flat. Bowel sounds are normal.      Palpations: Abdomen is soft.      Tenderness: There is no abdominal tenderness. There is no right CVA tenderness or left CVA tenderness.   Musculoskeletal:         General: Normal range of motion.      Cervical back: Normal range of motion and neck supple.   Skin:     General: Skin is warm and dry.      Capillary Refill: Capillary refill takes less than 2 seconds.   Neurological:      General: No focal deficit present.      Mental Status: She is alert and oriented to person, place, and time. Mental status is at baseline.   Psychiatric:         Mood and Affect: Mood normal.         Behavior: Behavior normal.         Thought Content: Thought content normal.         Judgment: Judgment normal.         Assessment/Plan:   Assessment    1. Oral thrush  nystatin (MYCOSTATIN) 627606 UNIT/ML Suspension   She will be treated for thrush.  Patient to follow-up for worsening or persistent symptoms.    AVS handout given and reviewed with patient. Pt educated on red flags and when to seek treatment back in ER or UC.

## 2021-07-02 NOTE — PATIENT INSTRUCTIONS
Oral Thrush, Adult    Oral thrush, also called oral candidiasis, is a fungal infection that develops in the mouth and throat and on the tongue. It causes white patches to form on the mouth and tongue. Thrush is most common in older adults, but it can occur at any age.  Many cases of thrush are mild, but this infection can also be serious. Thrush can be a repeated (recurrent) problem for certain people who have a weak body defense system (immune system). The weakness can be caused by chronic illnesses, or by taking medicines that limit the body's ability to fight infection. If a person has difficulty fighting infection, the fungus that causes thrush can spread through the body. This can cause life-threatening blood or organ infections.  What are the causes?  This condition is caused by a fungus (yeast) called Candida albicans.  · This fungus is normally present in small amounts in the mouth and on other mucous membranes. It usually causes no harm.  · If conditions are present that allow the fungus to grow without control, it invades surrounding tissues and becomes an infection.  · Other Candida species can also lead to thrush (rare).  What increases the risk?  This condition is more likely to develop in:  · People with a weakened immune system.  · Older adults.  · People with HIV (human immunodeficiency virus).  · People with diabetes.  · People with dry mouth (xerostomia).  · Pregnant women.  · People with poor dental care, especially people who have false teeth.  · People who use antibiotic medicines.  What are the signs or symptoms?  Symptoms of this condition can vary from mild and moderate to severe and persistent. Symptoms may include:  · A burning feeling in the mouth and throat. This can occur at the start of a thrush infection.  · White patches that stick to the mouth and tongue. The tissue around the patches may be red, raw, and painful. If rubbed (during tooth brushing, for example), the patches and the  tissue of the mouth may bleed easily.  · A bad taste in the mouth or difficulty tasting foods.  · A cottony feeling in the mouth.  · Pain during eating and swallowing.  · Poor appetite.  · Cracking at the corners of the mouth.  How is this diagnosed?  This condition is diagnosed based on:  · Physical exam. Your health care provider will look in your mouth.  · Health history. Your health care provider will ask you questions about your health.  How is this treated?  This condition is treated with medicines called antifungals, which prevent the growth of fungi. These medicines are either applied directly to the affected area (topical) or swallowed (oral). The treatment will depend on the severity of the condition.  Mild thrush  Mild cases of thrush may clear up with the use of an antifungal mouth rinse or lozenges. Treatment usually lasts about 14 days.  Moderate to severe thrush  · More severe thrush infections that have spread to the esophagus are treated with an oral antifungal medicine. A topical antifungal medicine may also be used.  · For some severe infections, treatment may need to continue for more than 14 days.  · Oral antifungal medicines are rarely used during pregnancy because they may be harmful to the unborn child. If you are pregnant, talk with your health care provider about options for treatment.  Persistent or recurrent thrush  For cases of thrush that do not go away or keep coming back:  · Treatment may be needed twice as long as the symptoms last.  · Treatment will include both oral and topical antifungal medicines.  · People with a weakened immune system can take an antifungal medicine on a continuous basis to prevent thrush infections.  It is important to treat conditions that make a person more likely to get thrush, such as diabetes or HIV.  Follow these instructions at home:  Medicines  · Take over-the-counter and prescription medicines only as told by your health care provider.  · Talk with  your health care provider about an over-the-counter medicine called gentian violet, which kills bacteria and fungi.  Relieving soreness and discomfort  To help reduce the discomfort of thrush:  · Drink cold liquids such as water or iced tea.  · Try flavored ice treats or frozen juices.  · Eat foods that are easy to swallow, such as gelatin, ice cream, or custard.  · Try drinking from a straw if the patches in your mouth are painful.    General instructions  · Eat plain, unflavored yogurt as directed by your health care provider. Check the label to make sure the yogurt contains live cultures. This yogurt can help healthy bacteria to grow in the mouth and can stop the growth of the fungus that causes thrush.  · If you wear dentures, remove the dentures before going to bed, brush them vigorously, and soak them in a cleaning solution as directed by your health care provider.  · Rinse your mouth with a warm salt-water mixture several times a day. To make a salt-water mixture, completely dissolve 1/2-1 tsp of salt in 1 cup of warm water.  Contact a health care provider if:  · Your symptoms are getting worse or are not improving within 7 days of starting treatment.  · You have symptoms of a spreading infection, such as white patches on the skin outside of the mouth.  This information is not intended to replace advice given to you by your health care provider. Make sure you discuss any questions you have with your health care provider.  Document Released: 09/12/2005 Document Revised: 03/22/2019 Document Reviewed: 09/11/2017  [employer] Patient Education © 2020 [employer]

## 2021-08-06 ENCOUNTER — HOSPITAL ENCOUNTER (OUTPATIENT)
Dept: LAB | Facility: MEDICAL CENTER | Age: 46
End: 2021-08-06
Attending: PAIN MEDICINE
Payer: COMMERCIAL

## 2021-08-06 LAB
ALBUMIN SERPL BCP-MCNC: 3.7 G/DL (ref 3.2–4.9)
ALBUMIN/GLOB SERPL: 1.4 G/DL
ALP SERPL-CCNC: 76 U/L (ref 30–99)
ALT SERPL-CCNC: 35 U/L (ref 2–50)
ANION GAP SERPL CALC-SCNC: 13 MMOL/L (ref 7–16)
APPEARANCE UR: CLEAR
AST SERPL-CCNC: 10 U/L (ref 12–45)
BASOPHILS # BLD AUTO: 0.5 % (ref 0–1.8)
BASOPHILS # BLD: 0.04 K/UL (ref 0–0.12)
BILIRUB SERPL-MCNC: 0.2 MG/DL (ref 0.1–1.5)
BILIRUB UR QL STRIP.AUTO: NEGATIVE
BUN SERPL-MCNC: 12 MG/DL (ref 8–22)
CALCIUM SERPL-MCNC: 9.2 MG/DL (ref 8.5–10.5)
CHLORIDE SERPL-SCNC: 102 MMOL/L (ref 96–112)
CO2 SERPL-SCNC: 25 MMOL/L (ref 20–33)
COLOR UR: YELLOW
CREAT SERPL-MCNC: 0.6 MG/DL (ref 0.5–1.4)
EOSINOPHIL # BLD AUTO: 0.05 K/UL (ref 0–0.51)
EOSINOPHIL NFR BLD: 0.6 % (ref 0–6.9)
ERYTHROCYTE [DISTWIDTH] IN BLOOD BY AUTOMATED COUNT: 45.5 FL (ref 35.9–50)
GLOBULIN SER CALC-MCNC: 2.7 G/DL (ref 1.9–3.5)
GLUCOSE SERPL-MCNC: 100 MG/DL (ref 65–99)
GLUCOSE UR STRIP.AUTO-MCNC: NEGATIVE MG/DL
HCT VFR BLD AUTO: 35.9 % (ref 37–47)
HGB BLD-MCNC: 12.4 G/DL (ref 12–16)
IMM GRANULOCYTES # BLD AUTO: 0.02 K/UL (ref 0–0.11)
IMM GRANULOCYTES NFR BLD AUTO: 0.3 % (ref 0–0.9)
KETONES UR STRIP.AUTO-MCNC: NEGATIVE MG/DL
LEUKOCYTE ESTERASE UR QL STRIP.AUTO: NEGATIVE
LYMPHOCYTES # BLD AUTO: 2.73 K/UL (ref 1–4.8)
LYMPHOCYTES NFR BLD: 35.2 % (ref 22–41)
MCH RBC QN AUTO: 32.4 PG (ref 27–33)
MCHC RBC AUTO-ENTMCNC: 34.5 G/DL (ref 33.6–35)
MCV RBC AUTO: 93.7 FL (ref 81.4–97.8)
MICRO URNS: NORMAL
MONOCYTES # BLD AUTO: 0.41 K/UL (ref 0–0.85)
MONOCYTES NFR BLD AUTO: 5.3 % (ref 0–13.4)
NEUTROPHILS # BLD AUTO: 4.5 K/UL (ref 2–7.15)
NEUTROPHILS NFR BLD: 58.1 % (ref 44–72)
NITRITE UR QL STRIP.AUTO: NEGATIVE
NRBC # BLD AUTO: 0 K/UL
NRBC BLD-RTO: 0 /100 WBC
PH UR STRIP.AUTO: 6.5 [PH] (ref 5–8)
PLATELET # BLD AUTO: 305 K/UL (ref 164–446)
PMV BLD AUTO: 11.9 FL (ref 9–12.9)
POTASSIUM SERPL-SCNC: 3.5 MMOL/L (ref 3.6–5.5)
PROT SERPL-MCNC: 6.4 G/DL (ref 6–8.2)
PROT UR QL STRIP: NEGATIVE MG/DL
RBC # BLD AUTO: 3.83 M/UL (ref 4.2–5.4)
RBC UR QL AUTO: NEGATIVE
SODIUM SERPL-SCNC: 140 MMOL/L (ref 135–145)
SP GR UR STRIP.AUTO: 1.01
UROBILINOGEN UR STRIP.AUTO-MCNC: 0.2 MG/DL
WBC # BLD AUTO: 7.8 K/UL (ref 4.8–10.8)

## 2021-08-06 PROCEDURE — 80053 COMPREHEN METABOLIC PANEL: CPT

## 2021-08-06 PROCEDURE — 85025 COMPLETE CBC W/AUTO DIFF WBC: CPT

## 2021-08-06 PROCEDURE — 81003 URINALYSIS AUTO W/O SCOPE: CPT

## 2021-08-06 PROCEDURE — 36415 COLL VENOUS BLD VENIPUNCTURE: CPT

## 2021-12-10 ENCOUNTER — TELEMEDICINE (OUTPATIENT)
Dept: TELEHEALTH | Facility: TELEMEDICINE | Age: 46
End: 2021-12-10
Payer: COMMERCIAL

## 2021-12-10 DIAGNOSIS — N30.00 ACUTE CYSTITIS WITHOUT HEMATURIA: ICD-10-CM

## 2021-12-10 PROBLEM — M25.539 PAIN IN WRIST: Status: ACTIVE | Noted: 2021-12-10

## 2021-12-10 PROBLEM — S92.302A CLOSED FRACTURE OF METATARSAL BONE OF LEFT FOOT: Status: ACTIVE | Noted: 2020-09-28

## 2021-12-10 PROBLEM — G56.00 CARPAL TUNNEL SYNDROME: Status: ACTIVE | Noted: 2021-12-10

## 2021-12-10 PROCEDURE — 99213 OFFICE O/P EST LOW 20 MIN: CPT | Mod: 95 | Performed by: NURSE PRACTITIONER

## 2021-12-10 RX ORDER — NITROFURANTOIN 25; 75 MG/1; MG/1
100 CAPSULE ORAL EVERY 12 HOURS
Qty: 10 CAPSULE | Refills: 0 | Status: SHIPPED | OUTPATIENT
Start: 2021-12-10 | End: 2021-12-15

## 2021-12-10 ASSESSMENT — ENCOUNTER SYMPTOMS
FEVER: 0
FLANK PAIN: 0

## 2021-12-10 NOTE — PROGRESS NOTES
Subjective     Angelika Hamilton is a 46 y.o. female who presents with No chief complaint on file.            Angelika presents for a virtual visit in [place].  Identification was verified.  Patient was informed that encounter would be conducted over [employer], a secure, encrypted network and consent was obtained.  She reports a 2 day history of dysuria with urgency and frequency.  NO fever, chills, nausea, vomiting, diarrhea, abdominal pain or flank pain.  Not taking any meds to treat the symptoms.  She has occasional UTI historically but not recently.        Review of Systems   Constitutional: Negative for fever and malaise/fatigue.   Genitourinary: Positive for dysuria, frequency and urgency. Negative for flank pain and hematuria.     Medications, Allergies, and current problem list reviewed today in Epic         Objective     There were no vitals taken for this visit.     Physical Exam  Constitutional:       General: She is not in acute distress.     Appearance: Normal appearance. She is not ill-appearing, toxic-appearing or diaphoretic.   Pulmonary:      Effort: Pulmonary effort is normal.   Abdominal:      General: There is no distension.      Palpations: Abdomen is soft.      Tenderness: There is no abdominal tenderness. There is no right CVA tenderness or left CVA tenderness.   Skin:     Coloration: Skin is not jaundiced or pale.   Neurological:      Mental Status: She is alert and oriented to person, place, and time.   Psychiatric:         Mood and Affect: Mood normal.                             Assessment & Plan        1. Acute cystitis without hematuria  - nitrofurantoin (MACROBID) 100 MG Cap; Take 1 Capsule by mouth every 12 hours for 5 days.  Dispense: 10 Capsule; Refill: 0     Discussed exam findings with Angelika.  Differential reviewed.  Take full course of antibiotics.  Maintain adequate po hydration.  To ER if pain worsens, or if fever or vomiting develop.  RTC in 3-5 days if symptoms persist, sooner if  worse.  She verbalized understanding of and agreed with plan of care.

## 2023-01-04 PROBLEM — S73.199A LABRAL TEAR OF HIP JOINT: Status: ACTIVE | Noted: 2023-01-04

## 2023-01-04 PROBLEM — M25.552 LEFT HIP PAIN: Status: ACTIVE | Noted: 2023-01-04

## 2023-01-04 PROBLEM — M25.852 FEMOROACETABULAR IMPINGEMENT OF LEFT HIP: Status: ACTIVE | Noted: 2023-01-04

## 2023-01-18 ENCOUNTER — PRE-ADMISSION TESTING (OUTPATIENT)
Dept: ADMISSIONS | Facility: MEDICAL CENTER | Age: 48
End: 2023-01-18
Attending: ORTHOPAEDIC SURGERY
Payer: MEDICAID

## 2023-01-18 DIAGNOSIS — Z01.812 PRE-OPERATIVE LABORATORY EXAMINATION: ICD-10-CM

## 2023-01-18 LAB
ANION GAP SERPL CALC-SCNC: 10 MMOL/L (ref 7–16)
BUN SERPL-MCNC: 16 MG/DL (ref 8–22)
CALCIUM SERPL-MCNC: 9.3 MG/DL (ref 8.4–10.2)
CHLORIDE SERPL-SCNC: 103 MMOL/L (ref 96–112)
CO2 SERPL-SCNC: 26 MMOL/L (ref 20–33)
CREAT SERPL-MCNC: 0.8 MG/DL (ref 0.5–1.4)
ERYTHROCYTE [DISTWIDTH] IN BLOOD BY AUTOMATED COUNT: 41.6 FL (ref 35.9–50)
GFR SERPLBLD CREATININE-BSD FMLA CKD-EPI: 91 ML/MIN/1.73 M 2
GLUCOSE SERPL-MCNC: 98 MG/DL (ref 65–99)
HCT VFR BLD AUTO: 40 % (ref 37–47)
HGB BLD-MCNC: 13.6 G/DL (ref 12–16)
MCH RBC QN AUTO: 29.1 PG (ref 27–33)
MCHC RBC AUTO-ENTMCNC: 34 G/DL (ref 33.6–35)
MCV RBC AUTO: 85.7 FL (ref 81.4–97.8)
PLATELET # BLD AUTO: 364 K/UL (ref 164–446)
PMV BLD AUTO: 11.3 FL (ref 9–12.9)
POTASSIUM SERPL-SCNC: 4.1 MMOL/L (ref 3.6–5.5)
RBC # BLD AUTO: 4.67 M/UL (ref 4.2–5.4)
SODIUM SERPL-SCNC: 139 MMOL/L (ref 135–145)
WBC # BLD AUTO: 7.7 K/UL (ref 4.8–10.8)

## 2023-01-18 PROCEDURE — 80048 BASIC METABOLIC PNL TOTAL CA: CPT

## 2023-01-18 PROCEDURE — 85027 COMPLETE CBC AUTOMATED: CPT

## 2023-01-18 PROCEDURE — 36415 COLL VENOUS BLD VENIPUNCTURE: CPT

## 2023-01-18 RX ORDER — PROGESTERONE 100 MG/1
CAPSULE ORAL
Status: ON HOLD | COMMUNITY
Start: 2022-12-03 | End: 2023-01-26

## 2023-01-18 RX ORDER — PANTOPRAZOLE SODIUM 40 MG/1
40 TABLET, DELAYED RELEASE ORAL DAILY
COMMUNITY
Start: 2022-12-14

## 2023-01-18 RX ORDER — FLUTICASONE PROPIONATE 50 MCG
SPRAY, SUSPENSION (ML) NASAL
COMMUNITY
Start: 2022-12-03

## 2023-01-18 RX ORDER — TRAZODONE HYDROCHLORIDE 50 MG/1
50 TABLET ORAL NIGHTLY PRN
COMMUNITY
Start: 2022-12-10

## 2023-01-18 RX ORDER — ALBUTEROL SULFATE 90 UG/1
2 AEROSOL, METERED RESPIRATORY (INHALATION) EVERY 6 HOURS PRN
COMMUNITY
Start: 2022-12-03

## 2023-01-18 RX ORDER — NAPROXEN 500 MG/1
TABLET ORAL
COMMUNITY
Start: 2022-12-10

## 2023-01-18 RX ORDER — ESTRADIOL 0.1 MG/D
FILM, EXTENDED RELEASE TRANSDERMAL
COMMUNITY
Start: 2022-12-24

## 2023-01-18 RX ORDER — ACETAMINOPHEN 500 MG
1000 TABLET ORAL EVERY 6 HOURS PRN
COMMUNITY

## 2023-01-18 RX ORDER — ONDANSETRON 8 MG/1
TABLET, ORALLY DISINTEGRATING ORAL
COMMUNITY
Start: 2022-12-17

## 2023-01-18 RX ORDER — NYSTATIN 100000 U/G
OINTMENT TOPICAL
COMMUNITY
End: 2023-01-18

## 2023-01-18 RX ORDER — HYDROXYZINE PAMOATE 25 MG/1
CAPSULE ORAL
Status: ON HOLD | COMMUNITY
Start: 2022-11-12 | End: 2023-01-26

## 2023-01-18 RX ORDER — LISINOPRIL AND HYDROCHLOROTHIAZIDE 12.5; 1 MG/1; MG/1
1 TABLET ORAL EVERY MORNING
COMMUNITY
Start: 2022-12-02

## 2023-01-18 RX ORDER — BUPRENORPHINE HYDROCHLORIDE, NALOXONE HYDROCHLORIDE 8; 2 MG/1; MG/1
FILM, SOLUBLE BUCCAL; SUBLINGUAL
Status: ON HOLD | COMMUNITY
Start: 2022-11-28 | End: 2023-01-26

## 2023-01-18 RX ORDER — VENLAFAXINE HYDROCHLORIDE 75 MG/1
75 CAPSULE, EXTENDED RELEASE ORAL DAILY
COMMUNITY
Start: 2022-12-10

## 2023-01-18 RX ORDER — SUMATRIPTAN 25 MG/1
25-50 TABLET, FILM COATED ORAL
COMMUNITY

## 2023-01-18 RX ORDER — LORATADINE 10 MG/1
10 TABLET ORAL
COMMUNITY
Start: 2023-01-01

## 2023-01-18 ASSESSMENT — FIBROSIS 4 INDEX: FIB4 SCORE: 0.5

## 2023-01-18 NOTE — PREPROCEDURE INSTRUCTIONS
"Pre-admit appointment completed. \"Preparing for your procedure\" sheet given to pt along with verbal and written instructions. Pt instructed to continue regularly prescribed medications through the day before surgery. Pt instructed to take the following medications the day of surgery with a sip of water, per anesthesia protocol; protonix, and if needed-tylenol, flonase, zofran, albuterol.        "

## 2023-01-25 ENCOUNTER — ANESTHESIA EVENT (OUTPATIENT)
Dept: SURGERY | Facility: MEDICAL CENTER | Age: 48
End: 2023-01-25
Payer: MEDICAID

## 2023-01-26 ENCOUNTER — ANESTHESIA (OUTPATIENT)
Dept: SURGERY | Facility: MEDICAL CENTER | Age: 48
End: 2023-01-26
Payer: MEDICAID

## 2023-01-26 ENCOUNTER — HOSPITAL ENCOUNTER (OUTPATIENT)
Facility: MEDICAL CENTER | Age: 48
End: 2023-01-26
Attending: ORTHOPAEDIC SURGERY | Admitting: ORTHOPAEDIC SURGERY
Payer: MEDICAID

## 2023-01-26 ENCOUNTER — APPOINTMENT (OUTPATIENT)
Dept: RADIOLOGY | Facility: MEDICAL CENTER | Age: 48
End: 2023-01-26
Attending: ORTHOPAEDIC SURGERY
Payer: MEDICAID

## 2023-01-26 VITALS
HEIGHT: 65 IN | SYSTOLIC BLOOD PRESSURE: 158 MMHG | DIASTOLIC BLOOD PRESSURE: 95 MMHG | TEMPERATURE: 97 F | OXYGEN SATURATION: 92 % | BODY MASS INDEX: 33.09 KG/M2 | WEIGHT: 198.63 LBS | HEART RATE: 90 BPM | RESPIRATION RATE: 16 BRPM

## 2023-01-26 DIAGNOSIS — G89.18 POST-OPERATIVE PAIN: ICD-10-CM

## 2023-01-26 DIAGNOSIS — M25.852 FEMOROACETABULAR IMPINGEMENT OF LEFT HIP: ICD-10-CM

## 2023-01-26 DIAGNOSIS — M25.552 LEFT HIP PAIN: ICD-10-CM

## 2023-01-26 PROCEDURE — 160029 HCHG SURGERY MINUTES - 1ST 30 MINS LEVEL 4: Performed by: ORTHOPAEDIC SURGERY

## 2023-01-26 PROCEDURE — 700102 HCHG RX REV CODE 250 W/ 637 OVERRIDE(OP): Performed by: ORTHOPAEDIC SURGERY

## 2023-01-26 PROCEDURE — 160009 HCHG ANES TIME/MIN: Performed by: ORTHOPAEDIC SURGERY

## 2023-01-26 PROCEDURE — 700101 HCHG RX REV CODE 250: Performed by: ANESTHESIOLOGY

## 2023-01-26 PROCEDURE — 160048 HCHG OR STATISTICAL LEVEL 1-5: Performed by: ORTHOPAEDIC SURGERY

## 2023-01-26 PROCEDURE — A9270 NON-COVERED ITEM OR SERVICE: HCPCS | Performed by: ORTHOPAEDIC SURGERY

## 2023-01-26 PROCEDURE — 700102 HCHG RX REV CODE 250 W/ 637 OVERRIDE(OP): Performed by: ANESTHESIOLOGY

## 2023-01-26 PROCEDURE — 160035 HCHG PACU - 1ST 60 MINS PHASE I: Performed by: ORTHOPAEDIC SURGERY

## 2023-01-26 PROCEDURE — 160002 HCHG RECOVERY MINUTES (STAT): Performed by: ORTHOPAEDIC SURGERY

## 2023-01-26 PROCEDURE — 160036 HCHG PACU - EA ADDL 30 MINS PHASE I: Performed by: ORTHOPAEDIC SURGERY

## 2023-01-26 PROCEDURE — A9270 NON-COVERED ITEM OR SERVICE: HCPCS | Performed by: ANESTHESIOLOGY

## 2023-01-26 PROCEDURE — 700105 HCHG RX REV CODE 258: Performed by: ORTHOPAEDIC SURGERY

## 2023-01-26 PROCEDURE — 700111 HCHG RX REV CODE 636 W/ 250 OVERRIDE (IP): Performed by: ANESTHESIOLOGY

## 2023-01-26 PROCEDURE — 29862 HIP ARTHR0 W/DEBRIDEMENT: CPT | Mod: 59,LT | Performed by: ORTHOPAEDIC SURGERY

## 2023-01-26 PROCEDURE — 29914 HIP ARTHRO W/FEMOROPLASTY: CPT | Mod: LT | Performed by: ORTHOPAEDIC SURGERY

## 2023-01-26 PROCEDURE — 160041 HCHG SURGERY MINUTES - EA ADDL 1 MIN LEVEL 4: Performed by: ORTHOPAEDIC SURGERY

## 2023-01-26 PROCEDURE — 160025 RECOVERY II MINUTES (STATS): Performed by: ORTHOPAEDIC SURGERY

## 2023-01-26 PROCEDURE — 160046 HCHG PACU - 1ST 60 MINS PHASE II: Performed by: ORTHOPAEDIC SURGERY

## 2023-01-26 PROCEDURE — 01360 ANES OPEN PX LOWER 1/3 FEMUR: CPT | Performed by: ANESTHESIOLOGY

## 2023-01-26 PROCEDURE — 700101 HCHG RX REV CODE 250: Performed by: ORTHOPAEDIC SURGERY

## 2023-01-26 RX ORDER — SODIUM CHLORIDE, SODIUM LACTATE, POTASSIUM CHLORIDE, CALCIUM CHLORIDE 600; 310; 30; 20 MG/100ML; MG/100ML; MG/100ML; MG/100ML
INJECTION, SOLUTION INTRAVENOUS CONTINUOUS
Status: ACTIVE | OUTPATIENT
Start: 2023-01-26 | End: 2023-01-26

## 2023-01-26 RX ORDER — ONDANSETRON 2 MG/ML
INJECTION INTRAMUSCULAR; INTRAVENOUS PRN
Status: DISCONTINUED | OUTPATIENT
Start: 2023-01-26 | End: 2023-01-26 | Stop reason: SURG

## 2023-01-26 RX ORDER — HALOPERIDOL 5 MG/ML
1 INJECTION INTRAMUSCULAR
Status: DISCONTINUED | OUTPATIENT
Start: 2023-01-26 | End: 2023-01-26 | Stop reason: HOSPADM

## 2023-01-26 RX ORDER — DIPHENHYDRAMINE HYDROCHLORIDE 50 MG/ML
12.5 INJECTION INTRAMUSCULAR; INTRAVENOUS
Status: DISCONTINUED | OUTPATIENT
Start: 2023-01-26 | End: 2023-01-26 | Stop reason: HOSPADM

## 2023-01-26 RX ORDER — ROCURONIUM BROMIDE 10 MG/ML
INJECTION, SOLUTION INTRAVENOUS PRN
Status: DISCONTINUED | OUTPATIENT
Start: 2023-01-26 | End: 2023-01-26 | Stop reason: SURG

## 2023-01-26 RX ORDER — HYDROMORPHONE HYDROCHLORIDE 1 MG/ML
0.2 INJECTION, SOLUTION INTRAMUSCULAR; INTRAVENOUS; SUBCUTANEOUS
Status: DISCONTINUED | OUTPATIENT
Start: 2023-01-26 | End: 2023-01-26 | Stop reason: HOSPADM

## 2023-01-26 RX ORDER — HYDRALAZINE HYDROCHLORIDE 20 MG/ML
5 INJECTION INTRAMUSCULAR; INTRAVENOUS
Status: DISCONTINUED | OUTPATIENT
Start: 2023-01-26 | End: 2023-01-26 | Stop reason: HOSPADM

## 2023-01-26 RX ORDER — DIPHENHYDRAMINE HYDROCHLORIDE 50 MG/ML
INJECTION INTRAMUSCULAR; INTRAVENOUS PRN
Status: DISCONTINUED | OUTPATIENT
Start: 2023-01-26 | End: 2023-01-26 | Stop reason: SURG

## 2023-01-26 RX ORDER — ONDANSETRON 4 MG/1
4 TABLET, FILM COATED ORAL EVERY 8 HOURS PRN
Qty: 21 TABLET | Refills: 0 | Status: SHIPPED | OUTPATIENT
Start: 2023-01-26 | End: 2023-02-02

## 2023-01-26 RX ORDER — OXYCODONE HCL 5 MG/5 ML
5 SOLUTION, ORAL ORAL
Status: COMPLETED | OUTPATIENT
Start: 2023-01-26 | End: 2023-01-26

## 2023-01-26 RX ORDER — DEXAMETHASONE SODIUM PHOSPHATE 4 MG/ML
INJECTION, SOLUTION INTRA-ARTICULAR; INTRALESIONAL; INTRAMUSCULAR; INTRAVENOUS; SOFT TISSUE PRN
Status: DISCONTINUED | OUTPATIENT
Start: 2023-01-26 | End: 2023-01-26 | Stop reason: SURG

## 2023-01-26 RX ORDER — OXYCODONE HCL 5 MG/5 ML
10 SOLUTION, ORAL ORAL
Status: COMPLETED | OUTPATIENT
Start: 2023-01-26 | End: 2023-01-26

## 2023-01-26 RX ORDER — BACITRACIN ZINC 500 [USP'U]/G
OINTMENT TOPICAL
Status: DISCONTINUED | OUTPATIENT
Start: 2023-01-26 | End: 2023-01-26 | Stop reason: HOSPADM

## 2023-01-26 RX ORDER — BUPIVACAINE HYDROCHLORIDE AND EPINEPHRINE 5; 5 MG/ML; UG/ML
INJECTION, SOLUTION EPIDURAL; INTRACAUDAL; PERINEURAL
Status: DISCONTINUED | OUTPATIENT
Start: 2023-01-26 | End: 2023-01-26 | Stop reason: HOSPADM

## 2023-01-26 RX ORDER — HYDROMORPHONE HYDROCHLORIDE 1 MG/ML
0.4 INJECTION, SOLUTION INTRAMUSCULAR; INTRAVENOUS; SUBCUTANEOUS
Status: DISCONTINUED | OUTPATIENT
Start: 2023-01-26 | End: 2023-01-26 | Stop reason: HOSPADM

## 2023-01-26 RX ORDER — ONDANSETRON 2 MG/ML
4 INJECTION INTRAMUSCULAR; INTRAVENOUS
Status: DISCONTINUED | OUTPATIENT
Start: 2023-01-26 | End: 2023-01-26 | Stop reason: HOSPADM

## 2023-01-26 RX ORDER — OXYCODONE HYDROCHLORIDE AND ACETAMINOPHEN 5; 325 MG/1; MG/1
1 TABLET ORAL EVERY 4 HOURS PRN
Qty: 42 TABLET | Refills: 0 | Status: SHIPPED | OUTPATIENT
Start: 2023-01-26 | End: 2023-02-02

## 2023-01-26 RX ORDER — MIDAZOLAM HYDROCHLORIDE 1 MG/ML
INJECTION INTRAMUSCULAR; INTRAVENOUS PRN
Status: DISCONTINUED | OUTPATIENT
Start: 2023-01-26 | End: 2023-01-26 | Stop reason: SURG

## 2023-01-26 RX ORDER — KETOROLAC TROMETHAMINE 30 MG/ML
INJECTION, SOLUTION INTRAMUSCULAR; INTRAVENOUS PRN
Status: DISCONTINUED | OUTPATIENT
Start: 2023-01-26 | End: 2023-01-26 | Stop reason: SURG

## 2023-01-26 RX ORDER — ACETAMINOPHEN 500 MG
1000 TABLET ORAL ONCE
Status: COMPLETED | OUTPATIENT
Start: 2023-01-26 | End: 2023-01-26

## 2023-01-26 RX ORDER — HYDROMORPHONE HYDROCHLORIDE 1 MG/ML
0.1 INJECTION, SOLUTION INTRAMUSCULAR; INTRAVENOUS; SUBCUTANEOUS
Status: DISCONTINUED | OUTPATIENT
Start: 2023-01-26 | End: 2023-01-26 | Stop reason: HOSPADM

## 2023-01-26 RX ORDER — CEFAZOLIN SODIUM 1 G/3ML
2 INJECTION, POWDER, FOR SOLUTION INTRAMUSCULAR; INTRAVENOUS ONCE
Status: DISCONTINUED | OUTPATIENT
Start: 2023-01-26 | End: 2023-01-26

## 2023-01-26 RX ORDER — ESCITALOPRAM OXALATE 20 MG/1
20 TABLET ORAL
COMMUNITY
Start: 2023-01-02

## 2023-01-26 RX ORDER — METOCLOPRAMIDE HYDROCHLORIDE 5 MG/ML
INJECTION INTRAMUSCULAR; INTRAVENOUS PRN
Status: DISCONTINUED | OUTPATIENT
Start: 2023-01-26 | End: 2023-01-26 | Stop reason: SURG

## 2023-01-26 RX ORDER — LIDOCAINE HYDROCHLORIDE 20 MG/ML
INJECTION, SOLUTION EPIDURAL; INFILTRATION; INTRACAUDAL; PERINEURAL PRN
Status: DISCONTINUED | OUTPATIENT
Start: 2023-01-26 | End: 2023-01-26 | Stop reason: SURG

## 2023-01-26 RX ORDER — CEFAZOLIN SODIUM 1 G/3ML
INJECTION, POWDER, FOR SOLUTION INTRAMUSCULAR; INTRAVENOUS PRN
Status: DISCONTINUED | OUTPATIENT
Start: 2023-01-26 | End: 2023-01-26 | Stop reason: SURG

## 2023-01-26 RX ADMIN — METOCLOPRAMIDE 10 MG: 5 INJECTION, SOLUTION INTRAMUSCULAR; INTRAVENOUS at 13:29

## 2023-01-26 RX ADMIN — FENTANYL CITRATE 50 MCG: 50 INJECTION, SOLUTION INTRAMUSCULAR; INTRAVENOUS at 15:25

## 2023-01-26 RX ADMIN — KETOROLAC TROMETHAMINE 30 MG: 30 INJECTION, SOLUTION INTRAMUSCULAR at 14:22

## 2023-01-26 RX ADMIN — SODIUM CHLORIDE, POTASSIUM CHLORIDE, SODIUM LACTATE AND CALCIUM CHLORIDE: 600; 310; 30; 20 INJECTION, SOLUTION INTRAVENOUS at 13:00

## 2023-01-26 RX ADMIN — PROPOFOL 200 MG: 10 INJECTION, EMULSION INTRAVENOUS at 13:05

## 2023-01-26 RX ADMIN — ROCURONIUM BROMIDE 50 MG: 10 INJECTION, SOLUTION INTRAVENOUS at 13:05

## 2023-01-26 RX ADMIN — OXYCODONE HYDROCHLORIDE 10 MG: 5 SOLUTION ORAL at 14:47

## 2023-01-26 RX ADMIN — FENTANYL CITRATE 50 MCG: 50 INJECTION, SOLUTION INTRAMUSCULAR; INTRAVENOUS at 14:47

## 2023-01-26 RX ADMIN — FENTANYL CITRATE 100 MCG: 50 INJECTION, SOLUTION INTRAMUSCULAR; INTRAVENOUS at 13:05

## 2023-01-26 RX ADMIN — DEXAMETHASONE SODIUM PHOSPHATE 8 MG: 4 INJECTION, SOLUTION INTRA-ARTICULAR; INTRALESIONAL; INTRAMUSCULAR; INTRAVENOUS; SOFT TISSUE at 13:29

## 2023-01-26 RX ADMIN — FENTANYL CITRATE 50 MCG: 50 INJECTION, SOLUTION INTRAMUSCULAR; INTRAVENOUS at 15:02

## 2023-01-26 RX ADMIN — SUGAMMADEX 200 MG: 100 INJECTION, SOLUTION INTRAVENOUS at 14:22

## 2023-01-26 RX ADMIN — SODIUM CHLORIDE, POTASSIUM CHLORIDE, SODIUM LACTATE AND CALCIUM CHLORIDE: 600; 310; 30; 20 INJECTION, SOLUTION INTRAVENOUS at 13:44

## 2023-01-26 RX ADMIN — ACETAMINOPHEN 1000 MG: 500 TABLET ORAL at 11:51

## 2023-01-26 RX ADMIN — ONDANSETRON 4 MG: 2 INJECTION INTRAMUSCULAR; INTRAVENOUS at 13:29

## 2023-01-26 RX ADMIN — CEFAZOLIN 2 G: 330 INJECTION, POWDER, FOR SOLUTION INTRAMUSCULAR; INTRAVENOUS at 13:05

## 2023-01-26 RX ADMIN — MIDAZOLAM HYDROCHLORIDE 1 MG: 1 INJECTION, SOLUTION INTRAMUSCULAR; INTRAVENOUS at 13:00

## 2023-01-26 RX ADMIN — DIPHENHYDRAMINE HYDROCHLORIDE 12.5 MG: 50 INJECTION, SOLUTION INTRAMUSCULAR; INTRAVENOUS at 13:29

## 2023-01-26 RX ADMIN — SODIUM CHLORIDE, POTASSIUM CHLORIDE, SODIUM LACTATE AND CALCIUM CHLORIDE: 600; 310; 30; 20 INJECTION, SOLUTION INTRAVENOUS at 10:23

## 2023-01-26 RX ADMIN — LIDOCAINE HYDROCHLORIDE 100 MG: 20 INJECTION, SOLUTION EPIDURAL; INFILTRATION; INTRACAUDAL at 13:05

## 2023-01-26 ASSESSMENT — FIBROSIS 4 INDEX: FIB4 SCORE: 0.46

## 2023-01-26 ASSESSMENT — PAIN DESCRIPTION - PAIN TYPE: TYPE: ACUTE PAIN

## 2023-01-26 NOTE — ANESTHESIA PREPROCEDURE EVALUATION
Case: [id number] Date/Time: 01/26/23 1115    Procedures:       Left hip arthroscopic labral debridement versus repair with femoral neck osteoplasty (Left)      OSTEOPLASTY, FEMUR, NECK (Left)    Diagnosis:       Left hip pain [M25.552]      Femoroacetabular impingement of left hip [M25.852]      Labral tear of hip joint [S73.199A]    Pre-op diagnosis: Femoroacetabular impingement of left hip [M25.852]    Location:  OR 06 / SURGERY [facility]    Surgeons: Bobby Medrano M.D.          Relevant Problems   ANESTHESIA   (positive) PONV (postoperative nausea and vomiting)   (positive) Sleep apnea      NEURO   (positive) Migraine      CARDIAC   (positive) Hypertension   (positive) Migraine      Other   (positive) Anxiety   (positive) Chronic back pain   (positive) Chronic fatigue and malaise   (positive) Femoroacetabular impingement of left hip   (positive) Labral tear of hip joint   (positive) Left hip pain   (positive) Memory loss   (positive) Obesity       Physical Exam    Airway   Mallampati: II  TM distance: >3 FB  Neck ROM: full       Cardiovascular - normal exam  Rhythm: regular  Rate: normal  (-) murmur     Dental - normal exam           Pulmonary - normal exam  Breath sounds clear to auscultation     Abdominal    Neurological - normal exam                 Anesthesia Plan    ASA 2       Plan - general       Airway plan will be ETT        Plan Factors:   Patient was not previously instructed to abstain from smoking on day of procedure.  Patient did not smoke on day of procedure.      Induction: intravenous    Postoperative Plan: Postoperative administration of opioids is intended.    Pertinent diagnostic labs and testing reviewed    Informed Consent:    Anesthetic plan and risks discussed with patient.    Use of blood products discussed with: patient whom consented to blood products.

## 2023-01-26 NOTE — DISCHARGE INSTRUCTIONS
Toe- touch weight bearing for 3 weeks, use crutches.  Follow up appointment as scheduled.  Ice.  Keep dressings on and dry for 3 days then change to band-aids.  Call for questions or concerns.  May shower.       HOME CARE INSTRUCTIONS    ACTIVITY: Rest and take it easy for the first 24 hours.  A responsible adult is recommended to remain with you during that time.  It is normal to feel sleepy.  We encourage you to not do anything that requires balance, judgment or coordination.    FOR 24 HOURS DO NOT:  Drive, operate machinery or run household appliances.  Drink beer or alcoholic beverages.  Make important decisions or sign legal documents.    SPECIAL INSTRUCTIONS: Toe- touch weight bearing for 3 weeks, use crutches.  Follow up appointment as scheduled.  Ice.  Keep dressings on and dry for 3 days then change to band-aids.  Call for questions or concerns.  May shower.      DIET: To avoid nausea, slowly advance diet as tolerated, avoiding spicy or greasy foods for the first day.  Add more substantial food to your diet according to your physician's instructions.  Babies can be fed formula or breast milk as soon as they are hungry.  INCREASE FLUIDS AND FIBER TO AVOID CONSTIPATION.    SURGICAL DRESSING/BATHING: Keep dressings on and dry for 3 days then change to band-aids. May shower.      MEDICATIONS: Resume taking daily medication.  Take prescribed pain medication with food.  If no medication is prescribed, you may take non-aspirin pain medication if needed.  PAIN MEDICATION CAN BE VERY CONSTIPATING.  Take a stool softener or laxative such as senokot, pericolace, or milk of magnesia if needed.    Last pain medication given at 2:47 PM in the recovery room.    A follow-up appointment should be arranged with your doctor; call to schedule.    You should CALL YOUR PHYSICIAN if you develop:  Fever greater than 101 degrees F.  Pain not relieved by medication, or persistent nausea or vomiting.  Excessive bleeding (blood  soaking through dressing) or unexpected drainage from the wound.  Extreme redness or swelling around the incision site, drainage of pus or foul smelling drainage.  Inability to urinate or empty your bladder within 8 hours.  Problems with breathing or chest pain.    You should call 911 if you develop problems with breathing or chest pain.  If you are unable to contact your doctor or surgical center, you should go to the nearest emergency room or urgent care center.  Physician's telephone #: [phone]    MILD FLU-LIKE SYMPTOMS ARE NORMAL.  YOU MAY EXPERIENCE GENERALIZED MUSCLE ACHES, THROAT IRRITATION, HEADACHE AND/OR SOME NAUSEA.    If any questions arise, call your doctor.  If your doctor is not available, please feel free to call the Surgical Center at [phone].  The Center is open Monday through Friday from 7AM to 7PM.      A registered nurse may call you a few days after your surgery to see how you are doing after your procedure.    You may also receive a survey in the mail within the next two weeks and we ask that you take a few moments to complete the survey and return it to us.  Our goal is to provide you with very good care and we value your comments.     Depression / Suicide Risk    As you are discharged from this [facility] facility, it is important to learn how to keep safe from harming yourself.    Recognize the warning signs:  Abrupt changes in personality, positive or negative- including increase in energy   Giving away possessions  Change in eating patterns- significant weight changes-  positive or negative  Change in sleeping patterns- unable to sleep or sleeping all the time   Unwillingness or inability to communicate  Depression  Unusual sadness, discouragement and loneliness  Talk of wanting to die  Neglect of personal appearance   Rebelliousness- reckless behavior  Withdrawal from people/activities they love  Confusion- inability to concentrate     If you or a loved one observes any of  these behaviors or has concerns about self-harm, here's what you can do:  Talk about it- your feelings and reasons for harming yourself  Remove any means that you might use to hurt yourself (examples: pills, rope, extension cords, firearm)  Get professional help from the community (Mental Health, Substance Abuse, psychological counseling)  Do not be alone:Call your Safe Contact- someone whom you trust who will be there for you.  Call your local CRISIS HOTLINE 084-6789 or 650-896-4483  Call your local Children's Mobile Crisis Response Team [place] (278) 704-8119 or www.WIB  Call the toll free National Suicide Prevention Hotlines   National Suicide Prevention Lifeline 615-485-WIXK (8734)  National Hope Line Network 800-SUICIDE (361-9295)    I acknowledge receipt and understanding of these Home Care instructions.

## 2023-01-26 NOTE — ANESTHESIA PROCEDURE NOTES
Airway    Date/Time: 1/26/2023 1:07 PM  Performed by: Do Valencia M.D.  Authorized by: Do Valencia M.D.     Location:  OR  Urgency:  Elective  Indications for Airway Management:  Anesthesia      Spontaneous Ventilation: absent    Sedation Level:  Deep  Preoxygenated: Yes    Patient Position:  Sniffing  MILS Maintained Throughout: No    Mask Difficulty Assessment:  2 - vent by mask + OA or adjuvant +/- NMBA  Final Airway Type:  Endotracheal airway  Final Endotracheal Airway:  ETT  Cuffed: Yes    Technique Used for Successful ETT Placement:  Direct laryngoscopy  Devices/Methods Used in Placement:  Anterior pressure/BURP    Insertion Site:  Oral  Blade Type:  Akhil  Laryngoscope Blade/Videolaryngoscope Blade Size:  4  ETT Size (mm):  7.0  Measured from:  Lips  ETT to Lips (cm):  21  Placement Verified by: auscultation and capnometry    Cormack-Lehane Classification:  Grade I - full view of glottis  Number of Attempts at Approach:  1  Number of Other Approaches Attempted:  0

## 2023-01-26 NOTE — H&P
Surgery Orthopedic History & Physical Note    Date  1/26/2023    Primary Care Physician  Alannah Archuleta D.O.    CC  Pre-Op Diagnosis Codes:     * Left hip pain [M25.552]     * Femoroacetabular impingement of left hip [M25.852]     * Labral tear of hip joint [S73.199A]    HPI  This is a 47 y.o. female who presented with left hip pain secondary to acetabular labral tear, cam YING.  She elects to undergo left hip arthroscopy with repairs as indicated.    Past Medical History:   Diagnosis Date    Anemia 12/2022    Arthritis     Chickenpox     as child    Chronic back pain     Follow with Dr Madison for pain mangement    COVID-19 10/2021    Gestational diabetes 2013    Heart burn     taking pantoprazole    History of insomnia     Hypertension     Left hip pain 01/17/2023    Lumbar herniated disc     L3-L4, L5-S1    Migraine     Pneumonia 2014    After hysterectomy    PONV (postoperative nausea and vomiting) 2014    Psychiatric problem     Depression    Seizure (HCC) 04/2016    due to xanax toxicity.    Sleep apnea     had CPAP then had 30 lb wt loss and then restudy was NEGATIVE.(APPROX 2018 or 2019)    Snoring     Urinary tract infection     Resolved       Past Surgical History:   Procedure Laterality Date    FUSION Right 08/2022    HIP SI JOINT FUSION-with bone    COLONOSCOPY  2018    hx of several    ABDOMINAL HYSTERECTOMY TOTAL  2014    PRIMARY C SECTION  2014    BRENTON BY LAPAROSCOPY      early 2000's    GYN LAPAROSCOPY      1990's x2-for endometriosis       Current Facility-Administered Medications   Medication Dose Route Frequency Provider Last Rate Last Admin    lactated ringers infusion   Intravenous Continuous Bobby Medrano M.D. 10 mL/hr at 01/26/23 1023 New Bag at 01/26/23 1023    ceFAZolin (Ancef) 2 g in  mL IVPB  2 g Intravenous Pre-Op Once Bobby Medrano M.D.           Social History     Socioeconomic History    Marital status:      Spouse name: Not on file    Number of children: Not on file     Years of education: Not on file    Highest education level: Not on file   Occupational History    Not on file   Tobacco Use    Smoking status: Former     Types: Cigarettes     Quit date:      Years since quittin.0    Smokeless tobacco: Never    Tobacco comments:     As a teen-occas cig less than 1 year time frame.    Vaping Use    Vaping Use: Never used   Substance and Sexual Activity    Alcohol use: Not Currently    Drug use: Yes     Types: Oral     Comment: edible marijuana at night prn    Sexual activity: Yes     Partners: Male   Other Topics Concern    Not on file   Social History Narrative    Not on file     Social Determinants of Health     Financial Resource Strain: Not on file   Food Insecurity: Not on file   Transportation Needs: Not on file   Physical Activity: Not on file   Stress: Not on file   Social Connections: Not on file   Intimate Partner Violence: Not on file   Housing Stability: Not on file       Family History   Problem Relation Age of Onset    Heart Disease Paternal Grandmother     Stroke Paternal Grandmother     Arthritis Paternal Grandmother     Arthritis Mother     Arthritis Father     Arthritis Maternal Grandmother     Arthritis Maternal Grandfather     Arthritis Paternal Grandfather     Cancer Paternal Grandfather         Skin cancer    Heart Disease Paternal Grandfather     Breast Cancer Maternal Aunt        Allergies  Sulfa drugs and Tape    Review of Systems  Negative    Physical Exam  Lungs:  CTA bilat  CV:  RRR  Left hip:  pain with FADIR testing  Vital Signs  Blood Pressure: (!) 150/105   Temperature: 36.6 °C (97.9 °F)   Pulse: 75   Respiration: 18   Pulse Oximetry: 98 %       Labs:                    Radiology:  DX-PORTABLE FLUOROSCOPY < 1 HOUR Is the patient pregnant? No    (Results Pending)   DX-HIP-UNILATERAL-WITHOUT PELVIS-1 VIEW LEFT    (Results Pending)         Assessment/Plan:  Pre-Op Diagnosis Codes:     * Left hip pain [M25.552]     * Femoroacetabular impingement  of left hip [M25.852]     * Labral tear of hip joint [S73.199A]  Procedure(s):  Left hip arthroscopic labral debridement versus repair with femoral neck osteoplasty  OSTEOPLASTY, FEMUR, NECK

## 2023-01-26 NOTE — ANESTHESIA TIME REPORT
Anesthesia Start and Stop Event Times     Date Time Event    1/26/2023 12:39 PM Ready for Procedure    1/26/2023 01:00 PM Anesthesia Start    1/26/2023 02:40 PM Anesthesia Stop        Responsible Staff  01/26/23    Name Role Begin End    Do Valencia M.D. Anesthesiologist 01/26/23 01:00 PM 01/26/23 02:40 PM        Overtime Reason:  no overtime (within assigned shift)    Comments:                                                      
done

## 2023-01-26 NOTE — LETTER
January 5, 2023    Patient Name: Angelika Hamilton  Surgeon Name: Bobby Medrano M.D.  Surgery Facility: [facility] ([address] [place] [address])  Surgery Date: 1/26/2023    The time of your surgery is not final and may change up to and until the day of your surgery. You will be contacted 24-48 hours prior to your surgery date with your check-in and surgery time.    If you will not be at one of the below numbers please call the surgery scheduler at [phone]  Preferred Phone: [phone]    BEFORE YOUR SURGERY   COVID testing is not required for non-symptomatic vaccinated patients with proof of vaccination at [facility].  For un-vaccinated patients please refer to the following instructions for your COVID testing requirements:    COVID test required 4-7 days prior to surgery, failure to do so can result in a cancellation.    The following locations offer COVID testing:    Approved facilities for COVID testing, if scheduled at [facility]:  · [facility] from 7:30am-3:30pm at [address] [place] [address]  · [facility] [phone] (Please call for an appointment)  · Your local pharmacy    DAY OF YOUR SURGERY  Nothing to eat or drink after midnight     Refrain from smoking any substance after midnight prior to surgery. Smoking may interfere with the anesthetic and frequently produces nausea during the recovery period.    Continue taking all lifesaving medications. Including the morning of your surgery with small sip of water.    Please do NOT take on the day of surgery:  Diuretics: examples- furosemide (Lasix), spironolactone, hydrochlorothiazide  ACE-inhibitors: examples- lisinopril, ramipril, enalapril  “ARBs”: examples- losartan, Olmesartan, valsartan    Please arrive at the hospital/surgery center at the check-in time provided.     An adult will need to bring you and take you home after your surgery.     AFTER YOUR SURGERY  Post op Appointment:   Date:  02/08/2023   Time: 10:30AM   With: Bobby Medrano M.D.   Location: [address] [place] [facility] [place] [address]    - Therapy- Your first appointment should be 1  week(s) after your surgery. For your convenience we have 4 Physical Therapy locations: [place], [facility], [place], and [facility]. Call our office ASAP to schedule an appointment at [phone] or take the enclosed Therapy Prescription to a facility of your choice.  - Post Surgery - You will need someone to drive you home  - Post Surgery - You will need someone to stay with you for 24 hours  - You must have someone provide transportation post surgery and someone to monitor you for at least 24 hours post-surgery. If you don't have either of these your appointment will be canceled.     TIME OFF WORK  FMLA or Disability forms can be faxed directly to: [phone] or you may drop them off at [address] [facility] [place] [address]. Our office charges a $35.00 fee per form. Forms will be completed within 10 business days of drop off and payment received. For the status of your forms you may contact our disability office directly [phone].    MEDICATION INSTRUCTIONS **Please read section completely**    The following medications should be stopped a minimum of 10 days prior to surgery:  All over the counter, Supplements & Herbal medications    Anorectics: Phentermine (Adipex-P, Lomaira and Suprenza), Phentermine-topiramate (Qsymia), Bupropion-naltrexone (Contrave)    Opiod Partial Agonists/Opioid Antagonists: Buprenorphine (Subocone, Belbuca, Butrans, Probuphine Implant, Sublocade), Naltrexone (ReVia, Vivitrol), Naloxone    Amphetamines: Dextroamphetamine/Amphetamine (Adderall, Mydayis), Methylphenidate Hydrochloride (Concerta, Metadate, Methylin, Ritalin)    The following medications should be stopped 5 days prior to surgery:  Blood Thinners: Any Aspirin, Aspirin products, anti-inflammatories such as ibuprofen and any blood thinners such as Coumadin and Plavix.  Please consult your prescribing physician if you are on life saving blood thinners, in regards to when to stop medications prior to surgery.     The following medications should be stopped a minimum of 3 days prior to surgery:  PDE-5 inhibitors: Sildenafil (Viagra), Tadalafil (Cialis), Vardenafil (Levitra), Avanafil (Stendra)    MAO Inhibitors: Rasagiline (Azilect), Selegiline (Eldepryl, Emsam, Selapar), Isocarboxazid (Marplan), Phenelzine (Nardil)

## 2023-01-26 NOTE — OP REPORT
DATE OF SERVICE: 1/26/23    PREOPERATIVE DIAGNOSIS: left  Hip Acetabular labral tear with cam-type femoroacetabular impinement.     POSTOPERATIVE DIAGNOSIS:left  Hip Acetabular labral tear with cam-type femoroacetabular impinement.    PROCEDURE PERFORMED: left HIP ARTHROSCOPIC LABRAL DEBRIDEMENT WITH FEMORAL NECK OSTEOPLASTY.     SURGEON: Bobby Medrano MD    ASSISTANT: JOSEF Coulter    ANESTHESIA: General.    ANESTHESIOLOGIST: MD Annie    ANTIBIOTICS: Ancef    COMPLICATIONS: None.     IMPLANTS: None.     INDICATIONS: The patient presents with left hip pain recalcitrant to conservative treatment. The patient has evidence of an acetabular labral tear as well as cam-type femoroacetabular impingement. Options were discussed, including both non operative and operative treatments. Risks of surgery were discussed at length. All questions were answered. No guarantees were given.     PROCEDURE IN DETAIL: The patient was properly identified in the preoperative holding area, and the left hip was marked as the correct surgical site. The patient was then taken back to the operative room, and placed supine on the operating room table and underwent general anesthesia. Traction was applied. A hip x-ray was used. Traction was released. The left hip was sterilely prepped and draped in a standard fashion, and the traction was reapplied.     A standard anterolateral portal was created. The scope was then placed into the joint, and the anterior portal was created through the anterior triangle, and the joint was visualized. Minimal chondromalacial changes on the femoral head or the acetabular side were noted. There was evidence of complex tearing of the labrum. This was checked with a probe. This involved from approximately the 11:30 o'clock to 1 o'clock position on the labrum. The Samuri was used to perform a small anterior capsulotomy, and then the shaver and the Serfas were used to perform a labral debridement. This was  not a reparable type of tear, but significant frayed edges were present.     Portals were switched. The lateral labrum looked good after the 1:30 o'clock position. The rest of the joint was visualized. No loose bodies were found. The instruments were removed. Traction was released. The hip was placed in 45 degrees of flexion. Peripheral compartment was entered through the anterolateral portal, and the third portal was created approximately 4 cm distal to the anterolateral portal. A small anterior capsulotomy was performed exposing the head and neck junction. The portals were then switched, and the shaver and then the sam were used to perform a femoral neck osteoplasty. This was also checked periodically with x-ray. Excess fluid was drained and then incisions were closed using 3-0 nylon. A total of 30 ml of 0.5% Marcaine with epinephrine was injected and soft dressings were applied.       The patient was extubated and transferred to the recovery room in stable condition.

## 2023-01-26 NOTE — OR NURSING
0922: Brought patient back to pre-op and assumed care.   1020: Patient allergies and NPO status verified, home medication reconciliation completed and belongings secured. Patient verbalizes understanding of pain scale, expected course of stay and plan of care. Surgical site verified with patient. IV access established. Sequentials placed on legs.

## 2023-01-26 NOTE — OR NURSING
1436 Pt arrived from OR, report received from anesthesiologist and RN. Pt waking at this time. OPA in place but removed upon arrival, respirations spontaneous and unlabored. Surgical dressing in place to left hip, CDI, ice applied. Pedal pulse 2+, cap refill <3 sec.    1445 Pt rates pain 10/10, medicated per MAR      1500 Pt rates pain 9/10, medicated per MAR     1525 Pt rates pain 8/10, medicated per MAR. Pillows used for support and positioning.     1530 Pt  updated over the phone, pt states pain is improving. O2 weaned to 2L     1545 Pt states pain tolerable     1553 Pt meets criteria to transfer to stage 2.     1630 Report called to Simeon FREEMAN    1633 Pt transferred to stage 2 by CNA, chart and belongings on [facility] at time of transfer.

## 2023-01-27 ASSESSMENT — PAIN SCALES - GENERAL: PAIN_LEVEL: 5

## 2023-01-27 NOTE — ANESTHESIA POSTPROCEDURE EVALUATION
Patient: Angelika Hamilton    Procedure Summary     Date: 01/26/23 Room / Location:  OR 06 / SURGERY [facility]    Anesthesia Start: 1300 Anesthesia Stop: 1440    Procedures:       Left hip arthroscopic labral debridement  with femoral neck osteoplasty (Left: Hip)      OSTEOPLASTY, FEMUR, NECK (Left: Hip) Diagnosis:       Left hip pain      Femoroacetabular impingement of left hip      Labral tear of hip joint      (Femoroacetabular impingement of left hip [M25.852])    Surgeons: Bobby Medrano M.D. Responsible Provider: Do Valencia M.D.    Anesthesia Type: general ASA Status: 2          Final Anesthesia Type: general  Last vitals  BP   Blood Pressure: (Abnormal) 158/95    Temp   36.1 °C (97 °F)    Pulse   90   Resp   16    SpO2   92 %      Anesthesia Post Evaluation    Patient location during evaluation: PACU  Patient participation: complete - patient participated  Level of consciousness: awake and alert  Pain score: 5    Airway patency: patent  Anesthetic complications: no  Cardiovascular status: hemodynamically stable  Respiratory status: acceptable  Hydration status: euvolemic    PONV: none          There were no known notable events for this encounter.     Nurse Pain Score: 5 (NPRS)

## 2023-01-27 NOTE — OR NURSING
1633: To stage ll. Up to chair and dressed w/ CNA assist. Pain is tolerable, no nausea.    1653: Home care instructions reviewed w/ pt and . No questions, meets criteria for discharge.

## (undated) DEVICE — GLOVE BIOGEL SZ 8 SURGICAL PF LTX - (50PR/BX 4BX/CA)

## (undated) DEVICE — PACK SHOULDER ARTHROSCOPY SM - (2EA/CA)

## (undated) DEVICE — SHAVER PLUS ELITE LONG INCISOR 4.5MM (3EA/BX)

## (undated) DEVICE — TUBE E-T HI-LO CUFF 7.0MM (10EA/PK)

## (undated) DEVICE — TOWEL STOP TIMEOUT SAFETY FLAG (40EA/CA)

## (undated) DEVICE — TUBING DAY USE W/CARTRIDGE (10EA/BX)

## (undated) DEVICE — TOWELS CLOTH SURGICAL - (4/PK 20PK/CA)

## (undated) DEVICE — GLOVE BIOGEL PI ORTHO SZ 8 PF LF (40PR/BX)

## (undated) DEVICE — TUBE CONNECTING SUCTION - CLEAR PLASTIC STERILE 72 IN (50EA/CA)

## (undated) DEVICE — STAPLER SKIN DISP - (6/BX 10BX/CA) VISISTAT

## (undated) DEVICE — DRAPE SHOULDER FLUID CONTROL - 77 X 85 (10/CA)

## (undated) DEVICE — GOWN WARMING STANDARD FLEX - (30/CA)

## (undated) DEVICE — ELECTRODE DUAL RETURN W/ CORD - (50/PK)

## (undated) DEVICE — DRAPE C-ARM LARGE 41IN X 74 IN - (10/BX 2BX/CA)

## (undated) DEVICE — CHLORAPREP 26 ML APPLICATOR - ORANGE TINT(25/CA)

## (undated) DEVICE — SUCTION INSTRUMENT YANKAUER BULBOUS TIP W/O VENT (50EA/CA)

## (undated) DEVICE — WATER IRRIGATION STERILE 1000ML (12EA/CA)

## (undated) DEVICE — PACK HIP ARTHROSCOPIC DISPOSABLE

## (undated) DEVICE — HUMID-VENT HEAT AND MOISTURE EXCHANGE- (50/BX)

## (undated) DEVICE — SENSOR OXIMETER ADULT SPO2 RD SET (20EA/BX)

## (undated) DEVICE — DRAPE LARGE 3 QUARTER - (20/CA)

## (undated) DEVICE — GLOVE, LITE (PAIR)

## (undated) DEVICE — SUTURE 3-0 ETHILON FS-1 - (36/BX) 30 INCH

## (undated) DEVICE — LACTATED RINGERS INJ 1000 ML - (14EA/CA 60CA/PF)

## (undated) DEVICE — SUTURE 2-0 VICRYL PLUS CT-2 - 27 INCH (36/BX)

## (undated) DEVICE — CANISTER SUCTION RIGID RED 1500CC (40EA/CA)

## (undated) DEVICE — TUBING CASSETTE CROSSFLOW INTEGRATED (10EA/CA)

## (undated) DEVICE — PROBE VULCAN INTEGRA ABLATOR - E-FLEX

## (undated) DEVICE — SODIUM CHL IRRIGATION 0.9% 1000ML (12EA/CA)

## (undated) DEVICE — SODIUM CHL. IRRIGATION 0.9% 3000ML (4EA/CA 65CA/PF)

## (undated) DEVICE — BAG SPONGE COUNT 10.25 X 32 - BLUE (250/CA)

## (undated) DEVICE — Device

## (undated) DEVICE — SUTURE GENERAL